# Patient Record
Sex: MALE | Race: WHITE | NOT HISPANIC OR LATINO | Employment: FULL TIME | URBAN - METROPOLITAN AREA
[De-identification: names, ages, dates, MRNs, and addresses within clinical notes are randomized per-mention and may not be internally consistent; named-entity substitution may affect disease eponyms.]

---

## 2017-03-14 ENCOUNTER — APPOINTMENT (OUTPATIENT)
Dept: CT IMAGING | Facility: HOSPITAL | Age: 42
End: 2017-03-14

## 2017-03-14 LAB
ALBUMIN SERPL-MCNC: 4.6 G/DL (ref 3.5–5.2)
ALBUMIN/GLOB SERPL: 1.8 G/DL
ALP SERPL-CCNC: 63 U/L (ref 39–117)
ALT SERPL W P-5'-P-CCNC: 37 U/L (ref 1–41)
ANION GAP SERPL CALCULATED.3IONS-SCNC: 13.6 MMOL/L
AST SERPL-CCNC: 22 U/L (ref 1–40)
BACTERIA UR QL AUTO: ABNORMAL /HPF
BASOPHILS # BLD AUTO: 0.04 10*3/MM3 (ref 0–0.2)
BASOPHILS NFR BLD AUTO: 0.3 % (ref 0–1.5)
BILIRUB SERPL-MCNC: 1.2 MG/DL (ref 0.1–1.2)
BILIRUB UR QL STRIP: NEGATIVE
BUN BLD-MCNC: 20 MG/DL (ref 6–20)
BUN/CREAT SERPL: 14.5 (ref 7–25)
CALCIUM SPEC-SCNC: 10.1 MG/DL (ref 8.6–10.5)
CHLORIDE SERPL-SCNC: 103 MMOL/L (ref 98–107)
CLARITY UR: ABNORMAL
CO2 SERPL-SCNC: 25.4 MMOL/L (ref 22–29)
COLOR UR: YELLOW
CREAT BLD-MCNC: 1.38 MG/DL (ref 0.76–1.27)
DEPRECATED RDW RBC AUTO: 40.4 FL (ref 37–54)
EOSINOPHIL # BLD AUTO: 0.06 10*3/MM3 (ref 0–0.7)
EOSINOPHIL NFR BLD AUTO: 0.4 % (ref 0.3–6.2)
ERYTHROCYTE [DISTWIDTH] IN BLOOD BY AUTOMATED COUNT: 12.9 % (ref 11.5–14.5)
GFR SERPL CREATININE-BSD FRML MDRD: 57 ML/MIN/1.73
GLOBULIN UR ELPH-MCNC: 2.5 GM/DL
GLUCOSE BLD-MCNC: 134 MG/DL (ref 65–99)
GLUCOSE UR STRIP-MCNC: NEGATIVE MG/DL
HCT VFR BLD AUTO: 47.8 % (ref 40.4–52.2)
HGB BLD-MCNC: 16.4 G/DL (ref 13.7–17.6)
HGB UR QL STRIP.AUTO: ABNORMAL
HYALINE CASTS UR QL AUTO: ABNORMAL /LPF
IMM GRANULOCYTES # BLD: 0.03 10*3/MM3 (ref 0–0.03)
IMM GRANULOCYTES NFR BLD: 0.2 % (ref 0–0.5)
KETONES UR QL STRIP: NEGATIVE
LEUKOCYTE ESTERASE UR QL STRIP.AUTO: NEGATIVE
LIPASE SERPL-CCNC: 82 U/L (ref 13–60)
LYMPHOCYTES # BLD AUTO: 1.23 10*3/MM3 (ref 0.9–4.8)
LYMPHOCYTES NFR BLD AUTO: 8.3 % (ref 19.6–45.3)
MCH RBC QN AUTO: 29.3 PG (ref 27–32.7)
MCHC RBC AUTO-ENTMCNC: 34.3 G/DL (ref 32.6–36.4)
MCV RBC AUTO: 85.5 FL (ref 79.8–96.2)
MONOCYTES # BLD AUTO: 0.45 10*3/MM3 (ref 0.2–1.2)
MONOCYTES NFR BLD AUTO: 3 % (ref 5–12)
NEUTROPHILS # BLD AUTO: 13.03 10*3/MM3 (ref 1.9–8.1)
NEUTROPHILS NFR BLD AUTO: 87.8 % (ref 42.7–76)
NITRITE UR QL STRIP: NEGATIVE
PH UR STRIP.AUTO: 5.5 [PH] (ref 5–8)
PLATELET # BLD AUTO: 217 10*3/MM3 (ref 140–500)
PMV BLD AUTO: 9.9 FL (ref 6–12)
POTASSIUM BLD-SCNC: 4.8 MMOL/L (ref 3.5–5.2)
PROT SERPL-MCNC: 7.1 G/DL (ref 6–8.5)
PROT UR QL STRIP: NEGATIVE
RBC # BLD AUTO: 5.59 10*6/MM3 (ref 4.6–6)
RBC # UR: ABNORMAL /HPF
REF LAB TEST METHOD: ABNORMAL
SODIUM BLD-SCNC: 142 MMOL/L (ref 136–145)
SP GR UR STRIP: 1.02 (ref 1–1.03)
SQUAMOUS #/AREA URNS HPF: ABNORMAL /HPF
UROBILINOGEN UR QL STRIP: ABNORMAL
WBC NRBC COR # BLD: 14.84 10*3/MM3 (ref 4.5–10.7)
WBC UR QL AUTO: ABNORMAL /HPF

## 2017-03-14 PROCEDURE — 80053 COMPREHEN METABOLIC PANEL: CPT | Performed by: EMERGENCY MEDICINE

## 2017-03-14 PROCEDURE — 36415 COLL VENOUS BLD VENIPUNCTURE: CPT | Performed by: EMERGENCY MEDICINE

## 2017-03-14 PROCEDURE — 85025 COMPLETE CBC W/AUTO DIFF WBC: CPT | Performed by: EMERGENCY MEDICINE

## 2017-03-14 PROCEDURE — 74176 CT ABD & PELVIS W/O CONTRAST: CPT

## 2017-03-14 PROCEDURE — 99283 EMERGENCY DEPT VISIT LOW MDM: CPT

## 2017-03-14 PROCEDURE — 81001 URINALYSIS AUTO W/SCOPE: CPT | Performed by: EMERGENCY MEDICINE

## 2017-03-14 PROCEDURE — 83690 ASSAY OF LIPASE: CPT | Performed by: EMERGENCY MEDICINE

## 2017-03-14 RX ORDER — SODIUM CHLORIDE 0.9 % (FLUSH) 0.9 %
10 SYRINGE (ML) INJECTION AS NEEDED
Status: DISCONTINUED | OUTPATIENT
Start: 2017-03-14 | End: 2017-03-15 | Stop reason: HOSPADM

## 2017-03-15 ENCOUNTER — HOSPITAL ENCOUNTER (EMERGENCY)
Facility: HOSPITAL | Age: 42
Discharge: HOME | End: 2017-03-15
Attending: EMERGENCY MEDICINE | Admitting: EMERGENCY MEDICINE

## 2017-03-15 VITALS
RESPIRATION RATE: 16 BRPM | DIASTOLIC BLOOD PRESSURE: 86 MMHG | HEART RATE: 79 BPM | TEMPERATURE: 96.9 F | HEIGHT: 73 IN | WEIGHT: 205 LBS | BODY MASS INDEX: 27.17 KG/M2 | OXYGEN SATURATION: 98 % | SYSTOLIC BLOOD PRESSURE: 128 MMHG

## 2017-03-15 DIAGNOSIS — N23 RENAL COLIC ON RIGHT SIDE: Primary | ICD-10-CM

## 2017-03-15 LAB
HOLD SPECIMEN: NORMAL
WHOLE BLOOD HOLD SPECIMEN: NORMAL

## 2017-03-15 RX ORDER — ONDANSETRON 8 MG/1
8 TABLET, ORALLY DISINTEGRATING ORAL EVERY 8 HOURS PRN
Qty: 12 TABLET | Refills: 0 | Status: SHIPPED | OUTPATIENT
Start: 2017-03-15 | End: 2017-06-16

## 2017-03-15 RX ORDER — HYDROCODONE BITARTRATE AND ACETAMINOPHEN 5; 325 MG/1; MG/1
1 TABLET ORAL EVERY 6 HOURS PRN
Qty: 15 TABLET | Refills: 0 | Status: SHIPPED | OUTPATIENT
Start: 2017-03-15 | End: 2017-06-16

## 2017-03-15 NOTE — ED NOTES
C/o right flank pain, hx of kidney stones, states pain since around 1700.      Daniela Chua, KENDAL  03/14/17 2053

## 2017-03-15 NOTE — ED PROVIDER NOTES
EMERGENCY DEPARTMENT ENCOUNTER    CHIEF COMPLAINT  Chief Complaint: Flank Pain  History given by: Patient  History limited by:   Room Number: 13/13  PMD: Jesse Witt MD  Urologist: Dr. Becerra    HPI:  Pt is a 41 y.o. male who presents complaining of R flank pain that onset yesterday at 1700. Pt also reports some nausea with the pain. He describes the pain as sharp and stabbing. He has a hx of nephrolithiasis and states this pain was simlar to previous. While giving urine sample in the ED, pt reports feeling a sharp pain during urination and noted some hematuria and believes that he may have passed stone. He states that pain has improved since.     Duration:  9 hours  Onset: sudden  Timing: constant  Location: R flank  Radiation: none  Quality: sharp/stabbing  Intensity/Severity: severe  Progression: improved  Associated Symptoms: nausea, hematuria  Aggravating Factors: none  Alleviating Factors: urination  Previous Episodes: hx of nephrolithiasis  Treatment before arrival: none    PAST MEDICAL HISTORY  Active Ambulatory Problems     Diagnosis Date Noted   • Gastroesophageal reflux disease 09/29/2016   • Cough variant asthma 09/29/2016   • Hyperlipidemia 09/29/2016   • Gilbert's syndrome 09/29/2016   • Low back pain 09/29/2016   • Rosacea 09/29/2016   • Vitamin D deficiency 09/29/2016   • Healthcare maintenance 10/07/2016   • Environmental allergies 10/07/2016   • Snoring 10/07/2016     Resolved Ambulatory Problems     Diagnosis Date Noted   • No Resolved Ambulatory Problems     Past Medical History   Diagnosis Date   • DDD (degenerative disc disease), lumbar    • GERD (gastroesophageal reflux disease)    • Lumbar radiculopathy    • Nephrolithiasis        PAST SURGICAL HISTORY  Past Surgical History   Procedure Laterality Date   • Eye surgery       lasik bilateral 2013   • Cystoscopy       with insertion of ureteral stent 2013   • Septoplasty       2008 with T&A   • Vasectomy       2010   • Tonsillectomy and  "adenoidectomy       2008       FAMILY HISTORY  Family History   Problem Relation Age of Onset   • Hyperlipidemia Mother    • Rosacea Mother    • Heart disease Father    • Prostate cancer Father    • Hyperlipidemia Father    • Nephrolithiasis Father    • Hyperlipidemia Brother    • Diabetes Brother        SOCIAL HISTORY  Social History     Social History   • Marital status:      Spouse name: N/A   • Number of children: 2   • Years of education: N/A     Occupational History   • [employer] Director of Financial Aide      Social History Main Topics   • Smoking status: Former Smoker   • Smokeless tobacco: Not on file      Comment: 1 pk/yr in past quit   • Alcohol use Yes      Comment: 1-2 drinks/week   • Drug use: No   • Sexual activity: Yes     Partners: Female      Comment: wife only; no hx STD's     Other Topics Concern   • Not on file     Social History Narrative    Diet - \"normal\"; eats fruits and veggies    Exercise - None       ALLERGIES  Naproxen    REVIEW OF SYSTEMS  Review of Systems   Constitutional: Negative for activity change, appetite change and fever.   HENT: Negative for congestion and sore throat.    Eyes: Negative.    Respiratory: Negative for cough and shortness of breath.    Cardiovascular: Negative for chest pain and leg swelling.   Gastrointestinal: Positive for nausea. Negative for abdominal pain, diarrhea and vomiting.   Endocrine: Negative.    Genitourinary: Positive for flank pain and hematuria. Negative for decreased urine volume and dysuria.   Musculoskeletal: Negative for neck pain.   Skin: Negative for rash and wound.   Allergic/Immunologic: Negative.    Neurological: Negative for weakness, numbness and headaches.   Hematological: Negative.    Psychiatric/Behavioral: Negative.    All other systems reviewed and are negative.      PHYSICAL EXAM  ED Triage Vitals   Temp Heart Rate Resp BP SpO2   03/14/17 2053 03/14/17 2053 03/14/17 2053 03/14/17 2101 03/14/17 2053   96.9 °F (36.1 °C) 106 18 " 117/82 100 %      Temp src Heart Rate Source Patient Position BP Location FiO2 (%)   03/14/17 2053 03/14/17 2053 03/14/17 2101 03/14/17 2101 --   Tympanic Monitor Sitting Left arm        Physical Exam   Constitutional: He is oriented to person, place, and time and well-developed, well-nourished, and in no distress.   HENT:   Head: Normocephalic and atraumatic.   Eyes: EOM are normal. Pupils are equal, round, and reactive to light.   Neck: Normal range of motion. Neck supple.   Cardiovascular: Normal rate, regular rhythm and normal heart sounds.    Pulmonary/Chest: Effort normal and breath sounds normal. No respiratory distress.   Abdominal: Soft. There is tenderness in the right upper quadrant and right lower quadrant. There is CVA tenderness ( R). There is no rebound and no guarding.   Musculoskeletal: Normal range of motion. He exhibits no edema.   Neurological: He is alert and oriented to person, place, and time. He has normal sensation and normal strength.   Skin: Skin is warm and dry.   Psychiatric: Mood and affect normal.   Nursing note and vitals reviewed.      LAB RESULTS  Lab Results (last 24 hours)     Procedure Component Value Units Date/Time    CBC & Differential [[id number]] Collected:  03/14/17 2150    Specimen:  Blood Updated:  03/14/17 2212    Narrative:       The following orders were created for panel order CBC & Differential.  Procedure                               Abnormality         Status                     ---------                               -----------         ------                     CBC Auto Differential[[id number]]         Abnormal            Final result                 Please view results for these tests on the individual orders.    Comprehensive Metabolic Panel [[id number]]  (Abnormal) Collected:  03/14/17 2150    Specimen:  Blood Updated:  03/14/17 2231     Glucose 134 (H) mg/dL      BUN 20 mg/dL      Creatinine 1.38 (H) mg/dL      Sodium 142 mmol/L      Potassium 4.8 mmol/L       Chloride 103 mmol/L      CO2 25.4 mmol/L      Calcium 10.1 mg/dL      Total Protein 7.1 g/dL      Albumin 4.60 g/dL      ALT (SGPT) 37 U/L      AST (SGOT) 22 U/L      Alkaline Phosphatase 63 U/L      Total Bilirubin 1.2 mg/dL      eGFR Non African Amer 57 (L) mL/min/1.73      Globulin 2.5 gm/dL      A/G Ratio 1.8 g/dL      BUN/Creatinine Ratio 14.5      Anion Gap 13.6 mmol/L     Lipase [[id number]]  (Abnormal) Collected:  03/14/17 2150    Specimen:  Blood Updated:  03/14/17 2231     Lipase 82 (H) U/L     CBC Auto Differential [[id number]]  (Abnormal) Collected:  03/14/17 2150    Specimen:  Blood Updated:  03/14/17 2212     WBC 14.84 (H) 10*3/mm3      RBC 5.59 10*6/mm3      Hemoglobin 16.4 g/dL      Hematocrit 47.8 %      MCV 85.5 fL      MCH 29.3 pg      MCHC 34.3 g/dL      RDW 12.9 %      RDW-SD 40.4 fl      MPV 9.9 fL      Platelets 217 10*3/mm3      Neutrophil % 87.8 (H) %      Lymphocyte % 8.3 (L) %      Monocyte % 3.0 (L) %      Eosinophil % 0.4 %      Basophil % 0.3 %      Immature Grans % 0.2 %      Neutrophils, Absolute 13.03 (H) 10*3/mm3      Lymphocytes, Absolute 1.23 10*3/mm3      Monocytes, Absolute 0.45 10*3/mm3      Eosinophils, Absolute 0.06 10*3/mm3      Basophils, Absolute 0.04 10*3/mm3      Immature Grans, Absolute 0.03 10*3/mm3     Urinalysis With / Culture If Indicated [[id number]]  (Abnormal) Collected:  03/14/17 2154    Specimen:  Urine from Urine, Clean Catch Updated:  03/14/17 2209     Color, UA Yellow      Appearance, UA Cloudy (A)      pH, UA 5.5      Specific Gravity, UA 1.025      Glucose, UA Negative      Ketones, UA Negative      Bilirubin, UA Negative      Blood, UA Large (3+) (A)      Protein, UA Negative      Leuk Esterase, UA Negative      Nitrite, UA Negative      Urobilinogen, UA 0.2 E.U./dL     Urinalysis, Microscopic Only [[id number]]  (Abnormal) Collected:  03/14/17 2154    Specimen:  Urine from Urine, Clean Catch Updated:  03/14/17 2209     RBC, UA 21-30 (A) /HPF      WBC, UA 0-2 /HPF       Bacteria, UA None Seen /HPF      Squamous Epithelial Cells, UA 0-2 /HPF      Hyaline Casts, UA 3-6 /LPF      Methodology Automated Microscopy           I ordered the above labs and reviewed the results    RADIOLOGY  CT Abdomen Pelvis Without Contrast   Final Result   Final result by Luis Diaz MD (03/15/17 00:27:24)     Narrative:     CT SCAN OF THE ABDOMEN AND PELVIS WITHOUT CONTRAST     HISTORY: Previous kidney stones. Right flank pain.     The CT scan was performed as an emergency procedure through the abdomen  and pelvis without contrast and demonstrates the followin. The kidneys are normal in size and there is a very tiny  nonobstructing stone in the left kidney. There is no evidence of  ureteral stone or obstruction. The urinary bladder has a smooth contour.     2. The lung bases are clear. The liver, spleen, pancreas, and both  adrenal glands are unremarkable without intravenous contrast. The  gallbladder shows no gallstones or wall thickening.     3.  There is no aortic aneurysm or retroperitoneal lymphadenopathy. The  large and small bowel loops are normal in caliber and show no  inflammatory change. The appendix appears normal. The remainder of the  pelvis is also unremarkable.     This report was finalized on 3/15/2017 12:27 AM by Dr. Yoni Diaz MD.                I ordered the above noted radiological studies. Interpreted by radiologist. Reviewed by me in PACS.       PROCEDURES  Procedures      PROGRESS AND CONSULTS  ED Course   2:09 AM  Discussed with pt about his CT result showing no kidney. Informed pt of plan to discharge with analgesics and to follow up with his urologist. Pt understands and agrees with plan. All concerns addressed.         MEDICAL DECISION MAKING       MDM  Number of Diagnoses or Management Options  Renal colic on right side:      Amount and/or Complexity of Data Reviewed  Clinical lab tests: ordered and reviewed  Tests in the radiology section of CPT®:  ordered and reviewed           DIAGNOSIS  Final diagnoses:   Renal colic on right side       DISPOSITION  DISCHARGE    Patient discharged in stable condition.    Reviewed implications of results, diagnosis, meds, responsibility to follow up, warning signs and symptoms of possible worsening, potential complications and reasons to return to ER.    Patient/Family voiced understanding of above instructions.    Discussed plan for discharge, as there is no emergent indication for admission.  Pt/family is agreeable and understands need for follow up and repeat testing.  Pt is aware that discharge does not mean that nothing is wrong but it indicates no emergency is present that requires admission and they must continue care with follow-up as given below or physician of their choice.     FOLLOW-UP  Cresencio Becerra MD  [address] [address]  [phone]    Call today  For urologist follow-up         Medication List      New Prescriptions          HYDROcodone-acetaminophen 5-325 MG per tablet   Commonly known as:  NORCO   Take 1 tablet by mouth Every 6 (Six) Hours As Needed for Moderate Pain   (4-6).       ondansetron ODT 8 MG disintegrating tablet   Commonly known as:  ZOFRAN ODT   Take 1 tablet by mouth Every 8 (Eight) Hours As Needed for Nausea or   Vomiting.         Stop          fish oil 1000 MG capsule capsule               Latest Documented Vital Signs:  As of 3:23 AM  BP- 128/86 HR- 79 Temp- 96.9 °F (36.1 °C) (Tympanic) O2 sat- 98%    --  Documentation assistance provided by darnell Herrera for Dr. Mcintyre.  Information recorded by the scribe was done at my direction and has been verified and validated by me.       Felipe Herrera  03/15/17 0234       Armando Mcintyre MD  03/15/17 [id number]

## 2017-03-15 NOTE — ED NOTES
"Pt states he had sudden onset of severe pain in right flank area.  States it \"comes in waves every 10-15 minutes\".  He did have an episode of N/V, and vomited moderate amount of emesis.  Has hx of kidney stones.  Last episode was in October of 2016 and pt believes had passed the stone on his own.  Pt sees Dr. Becerra with urology     Mindy Byrne, KENDAL  03/14/17 2026    "

## 2017-03-17 ENCOUNTER — TELEPHONE (OUTPATIENT)
Dept: SOCIAL WORK | Facility: HOSPITAL | Age: 42
End: 2017-03-17

## 2017-05-25 RX ORDER — BUDESONIDE AND FORMOTEROL FUMARATE DIHYDRATE 160; 4.5 UG/1; UG/1
2 AEROSOL RESPIRATORY (INHALATION)
Qty: 10.2 G | Refills: 12 | Status: SHIPPED | OUTPATIENT
Start: 2017-05-25 | End: 2019-11-08

## 2017-06-12 RX ORDER — ATORVASTATIN CALCIUM 40 MG/1
TABLET, FILM COATED ORAL
Qty: 90 TABLET | Refills: 1 | Status: SHIPPED | OUTPATIENT
Start: 2017-06-12 | End: 2017-08-01

## 2017-06-16 ENCOUNTER — OFFICE VISIT (OUTPATIENT)
Dept: INTERNAL MEDICINE | Facility: CLINIC | Age: 42
End: 2017-06-16

## 2017-06-16 VITALS
SYSTOLIC BLOOD PRESSURE: 115 MMHG | HEART RATE: 81 BPM | TEMPERATURE: 98 F | BODY MASS INDEX: 27.7 KG/M2 | HEIGHT: 73 IN | DIASTOLIC BLOOD PRESSURE: 80 MMHG | WEIGHT: 209 LBS | OXYGEN SATURATION: 97 %

## 2017-06-16 DIAGNOSIS — G57.02 PIRIFORMIS SYNDROME OF LEFT SIDE: Primary | ICD-10-CM

## 2017-06-16 PROCEDURE — 99214 OFFICE O/P EST MOD 30 MIN: CPT | Performed by: INTERNAL MEDICINE

## 2017-06-16 RX ORDER — METHYLPREDNISOLONE 4 MG/1
TABLET ORAL
Qty: 21 TABLET | Refills: 0 | Status: SHIPPED | OUTPATIENT
Start: 2017-06-16 | End: 2017-07-17

## 2017-06-16 NOTE — PROGRESS NOTES
"Hip Pain (left )      HPI  Enzo Sewell is a 41 y.o. male RTC In acute care:  Notes for \"about 4 weeks has been having some pain in L hip and lower leg\".  Notes pain occurs around 2 AM and then prevent from getting back to sleep.  Thought maybe tweaked back but does not really feel like it is back as has not back pain.  No pain when up and walking during day. Can have some pain when sits to long but can get up and walk and pain resolves.    At night, first pain starts around 10 PM when laying on couch. Resolves when stands, then recurs around 2AM when in bed. Pain is \"tingling\" and feels like \"it locks up\".  No weakness in leg.  No associated back pain. No specific event brought this on.  Pain is in L buttocks, L hamstring area to knee, and in L groin.   This pain is somewhat similar to low back pain, but in totally different location.    Is still active in yard. Still doing PT exercises that done in past for low back. Does stretches each night.   Has not used wallet in back pocket in years. Sits on pillow/ cushion at work.     Review of Systems   Musculoskeletal: Positive for joint pain (L buttocks and posterior hip). Negative for back pain, falls, joint swelling and muscle weakness.   Neurological: Negative for focal weakness.       The following portions of the patient's history were reviewed and updated as appropriate: allergies, current medications, past medical history and problem list.      Current Outpatient Prescriptions:   •  albuterol (PROAIR HFA) 108 (90 BASE) MCG/ACT inhaler, ProAir  (90 Base) MCG/ACT Inhalation Aerosol Solution; Patient Sig: ProAir  (90 Base) MCG/ACT Inhalation Aerosol Solution USE 2 PUFFS BY MOUTH EVERY 6 HOURS ATC for 3 days, then q 6 hours PRN; 8.5; 1; 01-Oct-2013; Active; Disregard prior PRN only Rx.  These are updated directions for pt for med., Disp: , Rfl:   •  atorvastatin (LIPITOR) 40 MG tablet, TAKE 1 TABLET DAILY, Disp: 90 tablet, Rfl: 1  •  " "budesonide-formoterol (SYMBICORT) 160-4.5 MCG/ACT inhaler, Inhale 2 puffs 2 (Two) Times a Day., Disp: 10.2 g, Rfl: 12  •  Cholecalciferol (VITAMIN D-3) 1000 UNITS capsule, Take  by mouth Daily., Disp: , Rfl:   •  Omega-3 Fatty Acids (FISH OIL) 1000 MG capsule capsule, Take  by mouth., Disp: , Rfl:   •  ranitidine (ZANTAC) 75 MG tablet, Take 1 tablet by mouth Daily As Needed., Disp: , Rfl:   •  MethylPREDNISolone (MEDROL, ROBERTO,) 4 MG tablet, Take as directed on package instructions., Disp: 21 tablet, Rfl: 0  •  ORACEA 40 MG capsule, , Disp: , Rfl:     Vitals:    06/16/17 0855   BP: 115/80   Pulse: 81   Temp: 98 °F (36.7 °C)   SpO2: 97%   Weight: 209 lb (94.8 kg)   Height: 73\" (185.4 cm)         Physical Exam   Constitutional: He is oriented to person, place, and time. He appears well-developed and well-nourished. No distress.   Pulmonary/Chest: Effort normal. No respiratory distress.   Musculoskeletal: He exhibits no edema.        Left hip: He exhibits normal range of motion, normal strength, no tenderness (no TTP over trochanteric bursa; no TTP over groin), no bony tenderness, no swelling and no crepitus.        Left knee: He exhibits normal range of motion and no swelling.        Lumbar back: He exhibits normal range of motion, no tenderness, no bony tenderness, no swelling and no deformity.   (-) straight leg raise test B  (-) pain with resisted external and internal rotation of L leg   Neurological: He is alert and oriented to person, place, and time. He has normal strength. He exhibits normal muscle tone. Gait normal.   Reflex Scores:       Patellar reflexes are 2+ on the right side and 2+ on the left side.       Achilles reflexes are 2+ on the right side and 2+ on the left side.  5/5 BLE strength proximal and distal     Vitals reviewed.      Assessment/ Plan  Diagnoses and all orders for this visit:    Piriformis syndrome of left side  -     MethylPREDNISolone (MEDROL, ROBERTO,) 4 MG tablet; Take as directed on " package instructions.    Other orders  -     ORACEA 40 MG capsule;         Return for Next scheduled follow up.      Discussion:  Enzo Sewell is a 41 y.o. male  With long hx of chronic low back pain RTC in acute care (new issue to examiner) with 4 weeks of L buttocks and hamstring region pain brought on by sitting and lying down, improved with ambulation. Exam is benign today and cannot reproduce pain.  Reviewed MRI (scanned to chart) that revealed L4-S1 DDD and DJD though was of mild degree and showed had resorbed L5-S1 disc herniation to L.  I am more suspicious today that pain is more related to piriformis syndrome then from low back based on exam and hx.  Will have pt complete Medrol dose pack over weekend and c/w PT exercises.  If pt is not able to get any relief, will have him reengage in PT.  Repeat MRI is option, but we both agree to hold on imaging at this time given these sx are not typical for his past low back issues.  Pt to call next week if not better or progression.

## 2017-06-19 ENCOUNTER — TELEPHONE (OUTPATIENT)
Dept: INTERNAL MEDICINE | Facility: CLINIC | Age: 42
End: 2017-06-19

## 2017-06-19 DIAGNOSIS — G57.02 PIRIFORMIS SYNDROME OF LEFT SIDE: Primary | ICD-10-CM

## 2017-06-19 DIAGNOSIS — M51.36 DDD (DEGENERATIVE DISC DISEASE), LUMBAR: ICD-10-CM

## 2017-06-19 NOTE — TELEPHONE ENCOUNTER
Would like pt to restart PT, but if this level of pain persists, will need to reimage low back.   PT order placed.

## 2017-06-19 NOTE — TELEPHONE ENCOUNTER
Pt called and stated that he was in a lot of pain last night with his hip. He was in thought of going to the hospital because he was in so much pain. He is wanting to know what he needs to do.

## 2017-06-20 ENCOUNTER — OFFICE VISIT (OUTPATIENT)
Dept: INTERNAL MEDICINE | Facility: CLINIC | Age: 42
End: 2017-06-20

## 2017-06-20 VITALS
SYSTOLIC BLOOD PRESSURE: 120 MMHG | HEIGHT: 73 IN | TEMPERATURE: 97.7 F | WEIGHT: 210 LBS | OXYGEN SATURATION: 99 % | BODY MASS INDEX: 27.83 KG/M2 | HEART RATE: 83 BPM | DIASTOLIC BLOOD PRESSURE: 90 MMHG

## 2017-06-20 DIAGNOSIS — M54.50 CHRONIC LOW BACK PAIN WITHOUT SCIATICA, UNSPECIFIED BACK PAIN LATERALITY: ICD-10-CM

## 2017-06-20 DIAGNOSIS — M51.36 DDD (DEGENERATIVE DISC DISEASE), LUMBAR: Primary | ICD-10-CM

## 2017-06-20 DIAGNOSIS — G57.02 PIRIFORMIS SYNDROME OF LEFT SIDE: ICD-10-CM

## 2017-06-20 DIAGNOSIS — G89.29 CHRONIC LOW BACK PAIN WITHOUT SCIATICA, UNSPECIFIED BACK PAIN LATERALITY: ICD-10-CM

## 2017-06-20 PROCEDURE — 72110 X-RAY EXAM L-2 SPINE 4/>VWS: CPT | Performed by: INTERNAL MEDICINE

## 2017-06-20 PROCEDURE — 99214 OFFICE O/P EST MOD 30 MIN: CPT | Performed by: INTERNAL MEDICINE

## 2017-06-20 RX ORDER — MELOXICAM 15 MG/1
15 TABLET ORAL DAILY
Qty: 10 TABLET | Refills: 0 | Status: SHIPPED | OUTPATIENT
Start: 2017-06-20 | End: 2017-07-17

## 2017-06-20 RX ORDER — CYCLOBENZAPRINE HCL 5 MG
5 TABLET ORAL NIGHTLY PRN
Qty: 30 TABLET | Refills: 0 | Status: SHIPPED | OUTPATIENT
Start: 2017-06-20 | End: 2017-07-17 | Stop reason: HOSPADM

## 2017-06-20 RX ORDER — TRAMADOL HYDROCHLORIDE 50 MG/1
50 TABLET ORAL EVERY 8 HOURS PRN
Qty: 30 TABLET | Refills: 1 | Status: SHIPPED | OUTPATIENT
Start: 2017-06-20 | End: 2017-08-07 | Stop reason: HOSPADM

## 2017-06-20 NOTE — PROGRESS NOTES
"Hip Pain (left )      HPI  Enzo Sewell is a 41 y.o. male RTC In short interval f/u:   Notes pain has progressed since seen last week.  Notes over weekend pain was so bad that almost went to ED.  Took 30 minutes to get out of bed due to severe pain.  Has to walk around a long time to \"stretch it out\".  \"I am not able to function\".  Pain was in groin and back of buttocks when woke in night.  Pain running down back of leg is less severe, but pt feels like entire area from buttocks to groin is most severe and has more consistent pain.   Has not started PT yet.    \"I have cleared my schedule due to work\" due to limited pain.  \"My wife is cancelling vacations\".     At this point is about 3 weeks into pain, but is now worse in last 7 days.    Steroids did not really help. Only notes that 10AM to 10PM does 'alright', but at night with lack of movement and laying down really causes severe pain.    Meds: Ibuprofen 4-6 OTC pills/ daily      Review of Systems   Constitution: Negative for chills and fever.   Skin: Negative for rash.   Musculoskeletal: Positive for back pain. Negative for muscle weakness.   Neurological: Positive for numbness (\"tingling\"). Negative for focal weakness and sensory change.       The following portions of the patient's history were reviewed and updated as appropriate: allergies, current medications, past medical history and problem list.      Current Outpatient Prescriptions:   •  albuterol (PROAIR HFA) 108 (90 BASE) MCG/ACT inhaler, ProAir  (90 Base) MCG/ACT Inhalation Aerosol Solution; Patient Sig: ProAir  (90 Base) MCG/ACT Inhalation Aerosol Solution USE 2 PUFFS BY MOUTH EVERY 6 HOURS ATC for 3 days, then q 6 hours PRN; 8.5; 1; 01-Oct-2013; Active; Disregard prior PRN only Rx.  These are updated directions for pt for med., Disp: , Rfl:   •  atorvastatin (LIPITOR) 40 MG tablet, TAKE 1 TABLET DAILY, Disp: 90 tablet, Rfl: 1  •  budesonide-formoterol (SYMBICORT) 160-4.5 MCG/ACT " "inhaler, Inhale 2 puffs 2 (Two) Times a Day., Disp: 10.2 g, Rfl: 12  •  Cholecalciferol (VITAMIN D-3) 1000 UNITS capsule, Take  by mouth Daily., Disp: , Rfl:   •  MethylPREDNISolone (MEDROL, ROBERTO,) 4 MG tablet, Take as directed on package instructions., Disp: 21 tablet, Rfl: 0  •  Omega-3 Fatty Acids (FISH OIL) 1000 MG capsule capsule, Take  by mouth., Disp: , Rfl:   •  ORACEA 40 MG capsule, , Disp: , Rfl:   •  ranitidine (ZANTAC) 75 MG tablet, Take 1 tablet by mouth Daily As Needed., Disp: , Rfl:   •  cyclobenzaprine (FLEXERIL) 5 MG tablet, Take 1 tablet by mouth At Night As Needed for Muscle Spasms., Disp: 30 tablet, Rfl: 0  •  meloxicam (MOBIC) 15 MG tablet, Take 1 tablet by mouth Daily., Disp: 10 tablet, Rfl: 0  •  traMADol (ULTRAM) 50 MG tablet, Take 1 tablet by mouth Every 8 (Eight) Hours As Needed for Moderate Pain (4-6)., Disp: 30 tablet, Rfl: 1    Vitals:    06/20/17 1102   BP: 120/90   Pulse: 83   Temp: 97.7 °F (36.5 °C)   SpO2: 99%   Weight: 210 lb (95.3 kg)   Height: 73\" (185.4 cm)         Physical Exam   Constitutional: He is oriented to person, place, and time. He appears well-developed and well-nourished.   HENT:   Head: Normocephalic and atraumatic.   Pulmonary/Chest: Effort normal. No respiratory distress.   Musculoskeletal: He exhibits no edema.        Lumbar back: He exhibits normal range of motion, no tenderness and no bony tenderness.   (-) straight leg raise B  No pain when laying back onto table or sitting up from lying position     Neurological: He is alert and oriented to person, place, and time. He has normal strength. No cranial nerve deficit. He exhibits normal muscle tone. Gait normal.   Reflex Scores:       Patellar reflexes are 2+ on the right side and 2+ on the left side.       Achilles reflexes are 2+ on the right side and 2+ on the left side.  5/5 BLE strength proximal and distal     Skin: No rash (on low back) noted.   Vitals reviewed.    Lumbar X-ray: Low back and L buttock/ hip " pain: No prior for comparison  DDD noted at L5-S1 with moderate joint space narrowing  No fracture  Normal alignment    L hip X-ray: Low back and L buttock/ hip pain: No prior for comparison  Normal hip joint  No joint space narrowing  No fracture    Assessment/ Plan  Diagnoses and all orders for this visit:    DDD (degenerative disc disease), lumbar  -     traMADol (ULTRAM) 50 MG tablet; Take 1 tablet by mouth Every 8 (Eight) Hours As Needed for Moderate Pain (4-6).  -     meloxicam (MOBIC) 15 MG tablet; Take 1 tablet by mouth Daily.  -     cyclobenzaprine (FLEXERIL) 5 MG tablet; Take 1 tablet by mouth At Night As Needed for Muscle Spasms.  -     MRI Lumbar Spine Without Contrast; Future  -     XR Spine Lumbar 4+ View (In Office)    Piriformis syndrome of left side  -     traMADol (ULTRAM) 50 MG tablet; Take 1 tablet by mouth Every 8 (Eight) Hours As Needed for Moderate Pain (4-6).  -     meloxicam (MOBIC) 15 MG tablet; Take 1 tablet by mouth Daily.  -     cyclobenzaprine (FLEXERIL) 5 MG tablet; Take 1 tablet by mouth At Night As Needed for Muscle Spasms.  -     MRI Lumbar Spine Without Contrast; Future    Chronic low back pain without sciatica, unspecified back pain laterality  -     traMADol (ULTRAM) 50 MG tablet; Take 1 tablet by mouth Every 8 (Eight) Hours As Needed for Moderate Pain (4-6).  -     meloxicam (MOBIC) 15 MG tablet; Take 1 tablet by mouth Daily.  -     cyclobenzaprine (FLEXERIL) 5 MG tablet; Take 1 tablet by mouth At Night As Needed for Muscle Spasms.  -     MRI Lumbar Spine Without Contrast; Future  -     XR Spine Lumbar 4+ View (In Office)        Return for Next scheduled follow up.      Discussion:  Enzo Sewell is a 41 y.o. male RTC in short interval f/u after recent visit for low back and L hip pain.  Pain has accelerated since visit on 6/16/17 and is now interfering with ability to work and sleep. Pt is s/p steroid dose pack with, again, acceleration in pain.  I reviewed 2011 MRI with  noted DDD at L4-L5 and hx of disc herniation that was largely resorbed on 2011 MRI. Given pt acceleration in pain and non-response to steroids, I do think we need to move forward with additional imaging. Pt is neuromuscularly intact on exam and does not require urgent MRI.  We discussed pain control today and will start tramadol for nighttime use with muslce relaxer (caution sedation side effects).  Pt will use Mobic in AM for daytime control when upright and moving. Tree checked today and Consent completed.  Will f/u with pt after MRI completed. I have asked pt to complete PT per our last visit.

## 2017-06-23 ENCOUNTER — HOSPITAL ENCOUNTER (OUTPATIENT)
Dept: MRI IMAGING | Facility: HOSPITAL | Age: 42
Discharge: HOME | End: 2017-06-23
Admitting: INTERNAL MEDICINE

## 2017-06-23 DIAGNOSIS — G89.29 CHRONIC LOW BACK PAIN WITHOUT SCIATICA, UNSPECIFIED BACK PAIN LATERALITY: ICD-10-CM

## 2017-06-23 DIAGNOSIS — M54.50 CHRONIC LOW BACK PAIN WITHOUT SCIATICA, UNSPECIFIED BACK PAIN LATERALITY: ICD-10-CM

## 2017-06-23 DIAGNOSIS — G57.02 PIRIFORMIS SYNDROME OF LEFT SIDE: ICD-10-CM

## 2017-06-23 DIAGNOSIS — M51.36 DDD (DEGENERATIVE DISC DISEASE), LUMBAR: ICD-10-CM

## 2017-06-23 PROCEDURE — 72148 MRI LUMBAR SPINE W/O DYE: CPT

## 2017-06-27 ENCOUNTER — HOSPITAL ENCOUNTER (OUTPATIENT)
Dept: PHYSICAL THERAPY | Facility: HOSPITAL | Age: 42
Setting detail: THERAPIES SERIES
Discharge: HOME | End: 2017-06-27

## 2017-06-27 DIAGNOSIS — R26.2 DIFFICULTY WALKING: ICD-10-CM

## 2017-06-27 DIAGNOSIS — M53.86 SCIATICA ASSOCIATED WITH DISORDER OF LUMBAR SPINE: ICD-10-CM

## 2017-06-27 DIAGNOSIS — Z78.9 DIFFICULTY WITH ACTIVITIES OF DAILY LIVING: Primary | ICD-10-CM

## 2017-06-27 PROCEDURE — 97162 PT EVAL MOD COMPLEX 30 MIN: CPT | Performed by: PHYSICAL THERAPIST

## 2017-06-27 PROCEDURE — 97110 THERAPEUTIC EXERCISES: CPT | Performed by: PHYSICAL THERAPIST

## 2017-06-27 NOTE — THERAPY EVALUATION
Outpatient Physical Therapy Ortho Initial Evaluation/Treatment Note  [facility]     Patient Name: Enzo Sewell  : 1975  MRN: [id number]  Today's Date: 2017      Visit Date: 2017    Patient Active Problem List   Diagnosis   • Gastroesophageal reflux disease   • Cough variant asthma   • Hyperlipidemia   • Gilbert's syndrome   • Low back pain   • Rosacea   • Vitamin D deficiency   • Healthcare maintenance   • Environmental allergies   • Snoring   • Piriformis syndrome of left side   • DDD (degenerative disc disease), lumbar        Past Medical History:   Diagnosis Date   • Cough variant asthma     normal PFT's at baseline with positive bronchoprovocation study in    • DDD (degenerative disc disease), lumbar     s/p epidural in ; more distant hx of disc herniation to L S1 nerve root in  largely resorbed on  MRI.    • GERD (gastroesophageal reflux disease)    • Gilbert's syndrome     suspected with variable indirect elevations of bilirubin on labs   • Hyperlipidemia    • Lumbar radiculopathy     s/p epidural in    • Nephrolithiasis     s/p stent and retreival 3/2013   • Rosacea    • Vitamin D deficiency         Past Surgical History:   Procedure Laterality Date   • CYSTOSCOPY      with insertion of ureteral stent    • EYE SURGERY      lasik bilateral    • SEPTOPLASTY      2008 with T&A   • TONSILLECTOMY AND ADENOIDECTOMY         • VASECTOMY      2010       Visit Dx:     ICD-10-CM ICD-9-CM   1. Difficulty with activities of daily living R53.81 799.3   2. Sciatica associated with disorder of lumbar spine M53.9 724.3   3. Difficulty walking R26.2 719.7             Patient History       17 1100          History    Chief Complaint Difficulty Walking;Difficulty with daily activities;Pain  -MP      Type of Pain Back pain;Lower Extremity / Leg  -MP      Date Current Problem(s) Began 06/15/17  -MP      Brief Description of Current Complaint Ammon reports having  lumbar spine issues for approximately 10 years being severe enough at times to receive epidural injections.  Since a few weeks ago he has experienced more discomfort sleeping and gets out of bed to stretch/move to reduce pain.  He recently began pain meds, muscle relaxers and anti-inflammatories which have helped reduce pain.somewhat but wants to reduce and control symptoms better.  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  -MP      Patient seeing anyone else for problem(s)? Yes   Dr. TAMERA Witt  -MP      How has patient tried to help current problem? Medication and exercises  -MP      What clinical tests have you had for this problem? MRI  -MP      Pain     Pain Location Back;Leg  -MP      Pain at Present 3  -MP      Pain at Best 2  -MP      Pain at Worst 9  -MP      Pain Frequency Constant/continuous  -MP      Pain Description Aching;Pins and needles;Spasm;Tingling  -MP      What Performance Factors Make the Current Problem(s) WORSE? Sleeping or supine position  -MP      What Performance Factors Make the Current Problem(s) BETTER? Repositioning, walking and stretching.  -MP      Tolerance Time- Lying 2-3 hours  -MP      Is your sleep disturbed? Yes  -MP      Is medication used to assist with sleep? Yes  -MP      Total hours of sleep per night 3-4  -MP      Fall Risk Assessment    Any falls in the past year: No  -MP      Services    Prior Rehab/Home Health Experiences No  -MP      Do you plan to receive Home Health services in the near future No  -MP      Daily Activities    Primary Language English  -MP      How does patient learn best? Listening;Pictures/Video  -MP      Pt Participated in  POC and Goals Yes  -MP      Safety    Are you being hurt, hit, or frightened by anyone at home or in your life? No  -MP      Are you being neglected by a caregiver No  -MP        User Key  (r) = Recorded By, (t) = Taken By, (c) = Cosigned By    Initials Name Provider Type    COTY Mcgraw PT Physical Therapist                PT Ortho       06/27/17 1300    Subjective Pain    Able to rate subjective pain? yes  -MP    Pre-Treatment Pain Level 3  -MP    Post-Treatment Pain Level 3  -MP    Posture/Observations    Posture/Observations Comments Posterior view of the spine in standing: R shoulder, scapula and ilium are lower than the L.  There is a slight scoliotic curve with L lumbar concavity.  Lateral view of the spine: forward head, decreased thoracic kyphosis and decreased lumbar lordosis.    -MP    Sensation    Sensation WNL? WNL  -MP    Light Touch No apparent deficits   L1-S2 dermatomes B equal/WNL  -MP    Additional Comments DTRs: LEs equal and WNL, Upper Motor Neuron Tests: Clonus and Babinski were normal  -MP    ROM (Range of Motion)    General ROM Detail L spine, standing, Flexion, minimal decrease with end range pain actively, Extenison, minimal to moderate decrease, LB L minimal decrease with end range pain, R minimal decrease with end range pain  -MP    MMT (Manual Muscle Testing)    General MMT Assessment Detail L L5 myotome, EHL, fatiguing weakness, other areas L2-S2 were WNL B  -MP    Pathomechanics    Pathomechanics Comments Special Tests: Loading/Compression test of the L spine, positive, Torsion L1-5 B negative, PA Shearing Tests, L1-3 negative, L4 and L5 positive with small amplituce.  SI tests were normal.  Dural Tests: Slump, positive, SLR L positive, R negative and Prone Bent Knee was B negative  -MP      User Key  (r) = Recorded By, (t) = Taken By, (c) = Cosigned By    Initials Name Provider Type    COTY Mcgraw PT Physical Therapist                Therapy Education       06/27/17 2994           Therapy Education    Given HEP  -MP      Program New  -MP      How Provided Written  -MP      Provided to Patient  -MP      Level of Understanding Teach back education performed   Lumbar bracing, in hooklying, and prone press ups were issued to begin his HEP.  -MP        User Key  (r) = Recorded By, (t) = Taken By, (c) = Cosigned By    Initials Name Provider Type    COTY Mcgraw PT Physical Therapist                PT OP Goals       06/27/17 1300       PT Short Term Goals    STG Date to Achieve 07/27/17  -MP     STG 1 Ammon reports that peak pain is reduced to 5/10 and is centrally located in the L spine and not in the L LE.  -MP     STG 1 Progress New  -MP     STG 2 Postural exercises are instructed to Ammon to reduce effects on soft tissue faulty posutural habits.  -MP     STG 2 Progress New  -MP     STG 3 Modified Oswestry disability is reduced to below 60%.  -MP     STG 3 Progress New  -MP     Long Term Goals    LTG Date to Achieve 08/25/17  -MP     LTG 1 L EHL MMT is equal to the right and displays no fatiguing weakness.  -MP     LTG 1 Progress New  -MP     LTG 2 Slump test and SLR test are negative.  -MP     LTG 2 Progress New  -MP     LTG 3 Ammon is independent with a complete HEP and self care education.  -MP     LTG 3 Progress New  -MP     LTG 4 Modified Oswestry disability is reduced to below 40%  -MP     LTG 4 Progress New  -MP     Time Calculation    PT Goal Re-Cert Due Date 07/27/17  -MP       User Key  (r) = Recorded By, (t) = Taken By, (c) = Cosigned By    Initials Name Provider Type    COTY Mcgraw PT Physical Therapist                PT Assessment/Plan       06/27/17 1322       PT Assessment    Functional Limitations Impaired gait;Impaired locomotion;Limitations in community activities;Limitations in functional capacity and performance;Performance in leisure activities;Performance in work activities  -MP     Impairments Pain;Muscle strength;Motor function;Range of  motion;Locomotion;Posture;Poor body mechanics;Joint mobility;Endurance  -MP     Assessment Comments Ammon presents today with symptoms consistent with L sciatica.  He tested positive to a lumbar spine loading/compression test, PA shearing at L4 and L5, fatiguing weakness at the L L5 myotome and has positve sciatica dural signs with Slump and L SLR test.  -MP     Please refer to paper survey for additional self-reported information Yes  -MP     Rehab Potential Good  -MP     Patient/caregiver participated in establishment of treatment plan and goals Yes  -MP     Patient would benefit from skilled therapy intervention Yes  -MP     PT Plan    PT Frequency --   1-2 x per week, due to his work schedule  -MP     Predicted Duration of Therapy Intervention (days/wks) 6-8 weeks  -MP     Planned CPT's? PT EVAL MOD COMPLELITY: 18914;PT RE-EVAL: 06732;PT MANUAL THERAPY EA 15 MIN: 60678;PT SELF CARE/HOME MGMT/TRAIN EA 15: 95017;PT THER PROC EA 15 MIN: 04823;PT AQUATIC THERAPY EA 15 MIN: 47056;PT ULTRASOUND EA 15 MIN: 55448;PT ELECTRICAL STIM UNATTEND:   -MP     PT Plan Comments Progess lumbar stabilization exercises and postural exercises next visit.  -MP       User Key  (r) = Recorded By, (t) = Taken By, (c) = Cosigned By    Initials Name Provider Type    COTY Mcgraw PT Physical Therapist                  Exercises       06/27/17 1300          Subjective Pain    Able to rate subjective pain? yes  -MP      Pre-Treatment Pain Level 3  -MP      Post-Treatment Pain Level 3  -MP      Exercise 1    Exercise Name 1 Therapeutic exercises, lumbar bracing, 10s x 10 and prone press ups x 10.  -MP        User Key  (r) = Recorded By, (t) = Taken By, (c) = Cosigned By    Initials Name Provider Type    COTY Mcgraw PT Physical Therapist                 Outcome Measures       06/27/17 1300          Modified Oswestry    Modified Oswestry Score/Comments 37/50, 74% disability  -MP      Functional Assessment    Outcome Measure Options  Irene García  -COTY        User Key  (r) = Recorded By, (t) = Taken By, (c) = Cosigned By    Initials Name Provider Type    COTY Mcgraw PT Physical Therapist            Time Calculation:   Start Time: 1145  Stop Time: 1230  Time Calculation (min): 45 min     Therapy Charges for Today     Code Description Service Date Service Provider Modifiers Qty    80816272047 HC PT EVAL MOD COMPLEXITY 2 6/27/2017 Serafin Mcgraw, PT GP 1    66639036472 HC PT THER PROC EA 15 MIN 6/27/2017 Serafin Mcgraw, PT GP 1          PT G-Codes  Outcome Measure Options: Millyd Raquel Mcgraw, PT  6/27/2017

## 2017-06-28 DIAGNOSIS — M51.26 LUMBAR DISC HERNIATION: Primary | ICD-10-CM

## 2017-06-30 ENCOUNTER — HOSPITAL ENCOUNTER (OUTPATIENT)
Dept: PHYSICAL THERAPY | Facility: HOSPITAL | Age: 42
Setting detail: THERAPIES SERIES
Discharge: HOME | End: 2017-06-30

## 2017-06-30 DIAGNOSIS — Z78.9 DIFFICULTY WITH ACTIVITIES OF DAILY LIVING: Primary | ICD-10-CM

## 2017-06-30 DIAGNOSIS — M53.86 SCIATICA ASSOCIATED WITH DISORDER OF LUMBAR SPINE: ICD-10-CM

## 2017-06-30 DIAGNOSIS — R26.2 DIFFICULTY WALKING: ICD-10-CM

## 2017-06-30 PROCEDURE — 97012 MECHANICAL TRACTION THERAPY: CPT | Performed by: PHYSICAL THERAPIST

## 2017-06-30 PROCEDURE — 97110 THERAPEUTIC EXERCISES: CPT | Performed by: PHYSICAL THERAPIST

## 2017-06-30 PROCEDURE — 97140 MANUAL THERAPY 1/> REGIONS: CPT | Performed by: PHYSICAL THERAPIST

## 2017-07-03 ENCOUNTER — TELEPHONE (OUTPATIENT)
Dept: INTERNAL MEDICINE | Facility: CLINIC | Age: 42
End: 2017-07-03

## 2017-07-03 ENCOUNTER — HOSPITAL ENCOUNTER (OUTPATIENT)
Dept: PHYSICAL THERAPY | Facility: HOSPITAL | Age: 42
Setting detail: THERAPIES SERIES
Discharge: HOME | End: 2017-07-03

## 2017-07-03 DIAGNOSIS — R26.2 DIFFICULTY WALKING: ICD-10-CM

## 2017-07-03 DIAGNOSIS — M53.86 SCIATICA ASSOCIATED WITH DISORDER OF LUMBAR SPINE: ICD-10-CM

## 2017-07-03 DIAGNOSIS — M54.10 RADICULAR PAIN OF LEFT LOWER EXTREMITY: Primary | ICD-10-CM

## 2017-07-03 DIAGNOSIS — M54.50 CHRONIC LOW BACK PAIN WITHOUT SCIATICA, UNSPECIFIED BACK PAIN LATERALITY: ICD-10-CM

## 2017-07-03 DIAGNOSIS — M51.36 DDD (DEGENERATIVE DISC DISEASE), LUMBAR: ICD-10-CM

## 2017-07-03 DIAGNOSIS — G89.29 CHRONIC LOW BACK PAIN WITHOUT SCIATICA, UNSPECIFIED BACK PAIN LATERALITY: ICD-10-CM

## 2017-07-03 DIAGNOSIS — Z78.9 DIFFICULTY WITH ACTIVITIES OF DAILY LIVING: Primary | ICD-10-CM

## 2017-07-03 PROCEDURE — 97012 MECHANICAL TRACTION THERAPY: CPT | Performed by: PHYSICAL THERAPIST

## 2017-07-03 PROCEDURE — G0283 ELEC STIM OTHER THAN WOUND: HCPCS | Performed by: PHYSICAL THERAPIST

## 2017-07-03 PROCEDURE — 97110 THERAPEUTIC EXERCISES: CPT | Performed by: PHYSICAL THERAPIST

## 2017-07-03 NOTE — THERAPY TREATMENT NOTE
Outpatient Physical Therapy Ortho Treatment Note  [facility]     Patient Name: Enzo Sewell  : 1975  MRN: [id number]  Today's Date: 7/3/2017      Visit Date: 2017    Visit Dx:    ICD-10-CM ICD-9-CM   1. Difficulty with activities of daily living R53.81 799.3   2. Sciatica associated with disorder of lumbar spine M53.9 724.3   3. Difficulty walking R26.2 719.7       Patient Active Problem List   Diagnosis   • Gastroesophageal reflux disease   • Cough variant asthma   • Hyperlipidemia   • Gilbert's syndrome   • Low back pain   • Rosacea   • Vitamin D deficiency   • Healthcare maintenance   • Environmental allergies   • Snoring   • Piriformis syndrome of left side   • DDD (degenerative disc disease), lumbar        Past Medical History:   Diagnosis Date   • Cough variant asthma     normal PFT's at baseline with positive bronchoprovocation study in    • DDD (degenerative disc disease), lumbar     s/p epidural in ; more distant hx of disc herniation to L S1 nerve root in  largely resorbed on  MRI.    • GERD (gastroesophageal reflux disease)    • Gilbert's syndrome     suspected with variable indirect elevations of bilirubin on labs   • Hyperlipidemia    • Lumbar radiculopathy     s/p epidural in    • Nephrolithiasis     s/p stent and retreival 3/2013   • Rosacea    • Vitamin D deficiency         Past Surgical History:   Procedure Laterality Date   • CYSTOSCOPY      with insertion of ureteral stent    • EYE SURGERY      lasik bilateral    • SEPTOPLASTY       with T&A   • TONSILLECTOMY AND ADENOIDECTOMY         • VASECTOMY                     PT Assessment/Plan       17 1810       PT Assessment    Assessment Comments Treatment seems to be helping for a few days.  He responds well to decompression and the low level postural exercises thus far.  He may be a candidate for a TENS unit and/or aqua therapy in future sessions.  -MP     PT Plan    PT Plan  Comments Monitor the effects of today's session.  If he feels the E stim is helpful a TENs unit may be prudent.  Consider aqua therapy.  -MP       User Key  (r) = Recorded By, (t) = Taken By, (c) = Cosigned By    Initials Name Provider Type    COTY Mcgraw, PT Physical Therapist                Modalities       07/03/17 1700          Traction 57932    Traction Type Lumbar  -MP      Rx Minutes 15  -MP      Duration Static  -MP      Position Supine   Bolster under knees  -MP      Weight --   60-70 lbs.  -MP        User Key  (r) = Recorded By, (t) = Taken By, (c) = Cosigned By    Initials Name Provider Type    COTY Mcgraw PT Physical Therapist                Exercises       07/03/17 1800          Subjective Comments    Subjective Comments Ammon had two really good days after his last session but last night, while sleeping, noticed pain increase in the L LE again.  -MP      Subjective Pain    Able to rate subjective pain? yes  -MP      Pre-Treatment Pain Level 5  -MP      Post-Treatment Pain Level 2  -MP      Subjective Pain Comment Ammon noticed pain be abolished from the R LB, and the L LBP became more centrailized and   -MP      Exercise 1    Exercise Name 1 Refer to land flow sheet  -MP      Exercise 2    Exercise Name 2 Progressed therapeutic exercises by adding wand flexion, hooklying, x 10.  -MP        User Key  (r) = Recorded By, (t) = Taken By, (c) = Cosigned By    Initials Name Provider Type    COTY Mcgraw, PT Physical Therapist                PT OP Goals       07/03/17 1800       PT Short Term Goals    STG Date to Achieve 07/27/17  -MP     STG 1 Ammon reports that peak pain is reduced to 5/10 and is centrally located in the L spine and not in the L LE.  -MP     STG 1 Progress Ongoing  -MP     STG 2 Postural exercises are instructed to Ammon to reduce effects on soft tissue faulty posutural habits.  -MP     STG 2 Progress Ongoing  -MP     STG 3 Modified Oswestry disability is reduced to below 60%.  -MP      STG 3 Progress Ongoing  -MP     Long Term Goals    LTG Date to Achieve 08/25/17  -MP     LTG 1 L EHL MMT is equal to the right and displays no fatiguing weakness.  -MP     LTG 1 Progress Ongoing  -MP     LTG 2 Slump test and SLR test are negative.  -MP     LTG 2 Progress Ongoing  -MP     LTG 3 Ammon is independent with a complete HEP and self care education.  -MP     LTG 3 Progress Ongoing  -MP     LTG 4 Modified Oswestry disability is reduced to below 40%  -MP     LTG 4 Progress Ongoing  -MP       User Key  (r) = Recorded By, (t) = Taken By, (c) = Cosigned By    Initials Name Provider Type    COTY Mcgraw PT Physical Therapist                Therapy Education       07/03/17 1809          Therapy Education    Given HEP  -MP      Program Progressed  -MP      How Provided Written  -MP      Provided to Patient  -MP      Level of Understanding Teach back education performed   Wand flexion, hooklying, was added to his HEP.  -MP        User Key  (r) = Recorded By, (t) = Taken By, (c) = Cosigned By    Initials Name Provider Type    COTY Mcgraw PT Physical Therapist        Time Calculation:   Start Time: 1640  Stop Time: 1745  Time Calculation (min): 65 min    Therapy Charges for Today     Code Description Service Date Service Provider Modifiers Qty    18304625174 HC PT TRACTION LUMBAR 7/3/2017 Serafin Mcgraw PT GP 1    84429302117 HC PT THER PROC EA 15 MIN 7/3/2017 Serafin Mcgraw PT GP 2    66609215075 HC PT ELECTRICAL STIM UNATTENDED 7/3/2017 Serafin Mcgraw PT  1          Serafin Mcgraw PT  7/3/2017

## 2017-07-03 NOTE — TELEPHONE ENCOUNTER
Patient called left a message for us to call him.     I return call and [facility]    Patient called back he spoke with Dr. García office today. They can't see him until mid part of August. Patient states he isn't sleeping more then two hours at a time. He states he needs some relief in then mean time. He would like to get in with pain management for injection in his back. He states any relief at this time would be great. I explained that you are not in the office until Thursday he stated to just give you the messages.

## 2017-07-05 PROBLEM — M54.10 RADICULAR PAIN OF LEFT LOWER EXTREMITY: Status: ACTIVE | Noted: 2017-07-05

## 2017-07-05 PROBLEM — G57.02 PIRIFORMIS SYNDROME OF LEFT SIDE: Status: RESOLVED | Noted: 2017-06-16 | Resolved: 2017-07-05

## 2017-07-10 ENCOUNTER — TELEPHONE (OUTPATIENT)
Dept: INTERNAL MEDICINE | Facility: CLINIC | Age: 42
End: 2017-07-10

## 2017-07-10 NOTE — TELEPHONE ENCOUNTER
I am not being dismissive, but I am concerned about pt anxiety over low back issue based on new complaints.  The fingertips should have nothing to do with pathology in low back and would be originating at completely different region of spine.   We can certainly try some gabapentin at night to see if helps and advance to TID if pt able to tolerate med.  Gabapentin can make people drowsy and affect gait, particularly at night, so must take cautiously.  This is nerve modulator med and is not a narcotic pain agent.    Please call in Gabapentin 300mg TID #90, NR   but discuss with pt to start with qhs dosing first and advance to TID only if needed and tolerable.  If pt finger issues persist, we will need to re-evaluate in office and, again, would not be related to low back pathology on MRI.

## 2017-07-10 NOTE — TELEPHONE ENCOUNTER
Pt called and stated that he is having numbness in his finger tips and limbs. He is scheduled for injections with pain management on the 21st and was wanting to know what could be done about the numbness in the meantime.

## 2017-07-11 RX ORDER — GABAPENTIN 300 MG/1
300 CAPSULE ORAL 3 TIMES DAILY
Qty: 90 CAPSULE | Refills: 1 | Status: SHIPPED | OUTPATIENT
Start: 2017-07-11 | End: 2017-08-25

## 2017-07-12 ENCOUNTER — HOSPITAL ENCOUNTER (OUTPATIENT)
Dept: PHYSICAL THERAPY | Facility: HOSPITAL | Age: 42
Setting detail: THERAPIES SERIES
Discharge: HOME | End: 2017-07-12

## 2017-07-12 DIAGNOSIS — M53.86 SCIATICA ASSOCIATED WITH DISORDER OF LUMBAR SPINE: ICD-10-CM

## 2017-07-12 DIAGNOSIS — Z78.9 DIFFICULTY WITH ACTIVITIES OF DAILY LIVING: Primary | ICD-10-CM

## 2017-07-12 DIAGNOSIS — R26.2 DIFFICULTY WALKING: ICD-10-CM

## 2017-07-12 PROCEDURE — 97110 THERAPEUTIC EXERCISES: CPT | Performed by: PHYSICAL THERAPIST

## 2017-07-12 PROCEDURE — 97012 MECHANICAL TRACTION THERAPY: CPT | Performed by: PHYSICAL THERAPIST

## 2017-07-12 NOTE — THERAPY TREATMENT NOTE
Outpatient Physical Therapy Ortho Treatment Note  [facility]     Patient Name: Enzo Sewell  : 1975  MRN: [id number]  Today's Date: 2017      Visit Date: 2017    Visit Dx:    ICD-10-CM ICD-9-CM   1. Difficulty with activities of daily living R53.81 799.3   2. Sciatica associated with disorder of lumbar spine M53.9 724.3   3. Difficulty walking R26.2 719.7       Patient Active Problem List   Diagnosis   • Gastroesophageal reflux disease   • Cough variant asthma   • Hyperlipidemia   • Gilbert's syndrome   • Low back pain   • Rosacea   • Vitamin D deficiency   • Healthcare maintenance   • Environmental allergies   • Snoring   • DDD (degenerative disc disease), lumbar   • Radicular pain of left lower extremity        Past Medical History:   Diagnosis Date   • Cough variant asthma     normal PFT's at baseline with positive bronchoprovocation study in    • DDD (degenerative disc disease), lumbar     s/p epidural in ; more distant hx of disc herniation to L S1 nerve root in  largely resorbed on  MRI.    • GERD (gastroesophageal reflux disease)    • Gilbert's syndrome     suspected with variable indirect elevations of bilirubin on labs   • Hyperlipidemia    • Lumbar radiculopathy     s/p epidural in    • Nephrolithiasis     s/p stent and retreival 3/2013   • Rosacea    • Vitamin D deficiency         Past Surgical History:   Procedure Laterality Date   • CYSTOSCOPY      with insertion of ureteral stent    • EYE SURGERY      lasik bilateral    • SEPTOPLASTY       with T&A   • TONSILLECTOMY AND ADENOIDECTOMY         • VASECTOMY                                   PT Assessment/Plan       17 1345       PT Assessment    Assessment Comments Patient feels lumbar traction is more beneficial than E-stim so opted to omit E-stim today.  He continues to report pain worse at night - still waking with L LE pain most nights b/ 2 and 4am.  He is scheduled for an  epidural next Friday.  He also reports episode of N/T in his hands last week after sleeping most of night on his side.    -RA     PT Plan    PT Plan Comments Continue with traction and advance ther ex per patient tolerance.  Consider aquatic therapy   -RA       User Key  (r) = Recorded By, (t) = Taken By, (c) = Cosigned By    Initials Name Provider Type    KANDY Moss, PT Physical Therapist                Modalities       07/12/17 1300          Traction 94598    Traction Type Lumbar  -RA      Rx Minutes 15  -RA      Duration Static  -RA      Position Supine   Bolster under knees  -RA      Weight --   60-70 lbs.  -RA        User Key  (r) = Recorded By, (t) = Taken By, (c) = Cosigned By    Initials Name Provider Type    KANDY Moss, PT Physical Therapist                Exercises       07/12/17 1300          Subjective Comments    Subjective Comments Pain in central LB and into L LE, worse at night.  Traction seems to help   -RA      Subjective Pain    Able to rate subjective pain? yes  -RA      Pre-Treatment Pain Level 5  -RA      Subjective Pain Comment 4-5/10  -RA      Exercise 1    Exercise Name 1 Refer to land flow sheet  -RA        User Key  (r) = Recorded By, (t) = Taken By, (c) = Cosigned By    Initials Name Provider Type    KANDY Moss PT Physical Therapist                                   Therapy Education       07/12/17 1400          Therapy Education    Given Symptoms/condition management;Posture/body mechanics  -RA      Program Reinforced  -RA      How Provided Verbal  -RA      Provided to Patient  -RA      Level of Understanding Teach back education performed  -RA        User Key  (r) = Recorded By, (t) = Taken By, (c) = Cosigned By    Initials Name Provider Type    KANDY Moss PT Physical Therapist                Time Calculation:   Start Time: 1330  Stop Time: 1415  Time Calculation (min): 45 min    Therapy Charges for Today     Code Description Service Date Service  Provider Modifiers Qty    64565087778  PT THER PROC EA 15 MIN 7/12/2017 Clarisse Moss, PT GP 2    [id number]  PT TRACTION LUMBAR 7/12/2017 Clarisse Moss, PT GP 1                    Clarisse Moss, PT  7/12/2017

## 2017-07-17 ENCOUNTER — OFFICE VISIT (OUTPATIENT)
Dept: NEUROSURGERY | Facility: CLINIC | Age: 42
End: 2017-07-17

## 2017-07-17 VITALS
BODY MASS INDEX: 27.54 KG/M2 | WEIGHT: 207.8 LBS | DIASTOLIC BLOOD PRESSURE: 77 MMHG | SYSTOLIC BLOOD PRESSURE: 114 MMHG | HEART RATE: 75 BPM | HEIGHT: 73 IN

## 2017-07-17 DIAGNOSIS — M51.16 DISPLACEMENT OF LUMBAR DISC WITH RADICULOPATHY: ICD-10-CM

## 2017-07-17 DIAGNOSIS — M51.36 DDD (DEGENERATIVE DISC DISEASE), LUMBAR: Primary | ICD-10-CM

## 2017-07-17 PROBLEM — M54.10 RADICULAR PAIN OF LEFT LOWER EXTREMITY: Status: RESOLVED | Noted: 2017-07-05 | Resolved: 2017-07-17

## 2017-07-17 PROCEDURE — 99244 OFF/OP CNSLTJ NEW/EST MOD 40: CPT | Performed by: PHYSICIAN ASSISTANT

## 2017-07-17 RX ORDER — CEFAZOLIN SODIUM 2 G/100ML
2 INJECTION, SOLUTION INTRAVENOUS ONCE
Status: CANCELLED | OUTPATIENT
Start: 2017-08-07 | End: 2017-07-17

## 2017-07-17 NOTE — PROGRESS NOTES
Subjective   Patient ID: Enzo Sewell is a 41 y.o. male is being seen for consultation today at the request of Nicki Coulter MD  for back and left leg pain. He denies any cause or injury.   He completed MDP with no relief.  He tried Cyclobenzaprine 10 mg but stopped taking it due to the way it made him feel.  Mr. Ascencio is currently going to PT 1-2 times a week with no relief. He had AMALIA in the past with mild relief. He is scheduled for a AMALIA this Friday. He takes Aleve 220 mg PRN and Gabapentin 300 mg HS for pain.    Back Pain   This is a chronic (10 years ) problem. The current episode started more than 1 year ago (3 months ). The problem has been rapidly worsening since onset. The pain is present in the lumbar spine. The pain radiates to the left knee, left thigh and left foot. The pain is at a severity of 7/10. The pain is moderate. The pain is worse during the night. The symptoms are aggravated by position and sitting. Associated symptoms include headaches, leg pain, numbness, tingling and weakness. Pertinent negatives include no bladder incontinence, bowel incontinence or perianal numbness. He has tried heat, ice, home exercises and bed rest (PT ) for the symptoms. The treatment provided mild relief.   Leg Pain    The pain is present in the left leg, left thigh, left foot and left knee. The pain is at a severity of 5/10. The pain is moderate. The pain has been constant since onset. Associated symptoms include numbness and tingling. He has tried ice, heat and rest (PT, last AMALIA 2011) for the symptoms. The treatment provided mild relief.       The following portions of the patient's history were reviewed and updated as appropriate: allergies, current medications, past family history, past medical history, past social history, past surgical history and problem list.    Review of Systems   Constitutional: Positive for activity change and fatigue.   Gastrointestinal: Positive for constipation. Negative for  bowel incontinence.   Genitourinary: Negative for bladder incontinence and difficulty urinating.   Musculoskeletal: Positive for back pain (left leg pain ).   Neurological: Positive for tingling, weakness, numbness and headaches.   Psychiatric/Behavioral: Positive for agitation, decreased concentration, dysphoric mood and sleep disturbance.   All other systems reviewed and are negative.      Objective   Physical Exam   Constitutional: He is oriented to person, place, and time. He appears well-developed and well-nourished.   HENT:   Head: Normocephalic and atraumatic.   Right Ear: External ear normal.   Left Ear: External ear normal.   Eyes: Conjunctivae and EOM are normal. Pupils are equal, round, and reactive to light. Right eye exhibits no discharge. Left eye exhibits no discharge.   Neck: Normal range of motion. Neck supple. No tracheal deviation present.   Pulmonary/Chest: Effort normal. No stridor. No respiratory distress.   Musculoskeletal: Normal range of motion. He exhibits no edema, tenderness or deformity.   Neurological: He is alert and oriented to person, place, and time. He has normal strength and normal reflexes. He displays no atrophy, no tremor and normal reflexes. No cranial nerve deficit or sensory deficit. He exhibits normal muscle tone. He displays a negative Romberg sign. He displays no seizure activity. Coordination and gait normal.   No long tract signs   Skin: Skin is warm and dry.   Psychiatric: He has a normal mood and affect. His behavior is normal. Judgment and thought content normal.   Nursing note and vitals reviewed.    Neurologic Exam     Mental Status   Oriented to person, place, and time.     Cranial Nerves     CN III, IV, VI   Pupils are equal, round, and reactive to light.  Extraocular motions are normal.     Motor Exam     Strength   Strength 5/5 throughout.       Assessment/Plan   Independent Review of Radiographic Studies:    Review the lumbar spine MRI from June 23, 2017.  It  does show mild facet arthropathy with a mild annular tear on the right at L4-L5.  There is also some facet arthropathy at L5-S1 but most significantly there is a large left disc herniation with severe left S1 nerve root compression.  Medical Decision Making:    Mr. Sewell was referred to us by Dr. Coulter for back and L leg pain.  The patient states that the pain started about 5 years ago initially.  At that point however it was really primarily just back pain with only rare episodes of mild radiculopathy.  He did epidural injections in 2008 in 2011 and did very well with only mild symptoms that he could manage by avoiding particular activities.  However in May he began having severe left buttock and posterior leg pain as well as increased low back pain.  The pain in the leg is severe and constant and worsens with any attempts to sit.  Standing or moving helps minimally but the pain is still incredibly limiting.  He denies focal weakness or incontinence.  He has had some physical therapy without relief and is scheduled for one epidural this Friday.    I did review the MRI with the patient and explained that given the severity of the nerve compression and shear size of the disc herniation it is not unreasonable to consider surgery.  He is incredibly uncomfortable and finding it difficult to tolerate the pain.  He understands that he could certainly try epidural injections but at this point feels that the pain is intolerable and would like to consider surgery.  I did review his MRI with Dr. García as well and he discussed surgery with the patient.  He explained there was approximately 85% to 90% chance of alleviating his radicular pain but that surgery was not intended to alleviate his more chronic mild low back pain.  He explained that he would have increased low back pain postoperatively but that it will improve with time.  He did discuss the procedure with him as well as the expected benefit and the fact there was a  2-3% at most risk of bleeding, infection, nerve damage.    This part is from Dr. García.  I did discuss the situation with the patient.  I told the patient about the risks, complications and expected outcome of the lumbar surgery.  I explained that there was an 85% chance of getting rid of the pain in the leg.  I explained that there would still be back pain after the surgery.  Initially this will be quite severe but will improve over time.  There is a 2 or 3% chance of infection, bleeding, CSF leak, damage to the nerve as a result of surgery, paralysis, as well as anesthetic risk.  There is a 5% chance of late instability which could require a fusion later on and a 10% chance of recurrent disc herniation.  We discussed the postoperative hospital and home course.    Mr. Sewell would like to proceed with surgery.  He will be scheduled for a:  Left L5-S1 laminectomy/discectomy with Metrx    Enzo was seen today for back pain and leg pain.    Diagnoses and all orders for this visit:    DDD (degenerative disc disease), lumbar    Displacement of lumbar disc with radiculopathy  -     Case Request; Standing  -     ceFAZolin (ANCEF) 2 g in sodium chloride 0.9 % 100 mL IVPB; Infuse 2 g into a venous catheter 1 (One) Time.  -     Case Request    Other orders  -     Follow anesthesia standing orders.  -     Obtain informed consent  -     Follow anesthesia standing orders.; Standing  -     RAYMOND hose- To be placed on patient in pre-op; Standing  -     SCD (sequential compression device)- to be placed on patient in Pre-op; Standing      Return for follow up after surgery (2wks postop), 2-3 week post op.

## 2017-07-19 ENCOUNTER — HOSPITAL ENCOUNTER (OUTPATIENT)
Dept: PHYSICAL THERAPY | Facility: HOSPITAL | Age: 42
Setting detail: THERAPIES SERIES
End: 2017-07-19

## 2017-08-01 ENCOUNTER — APPOINTMENT (OUTPATIENT)
Dept: PREADMISSION TESTING | Facility: HOSPITAL | Age: 42
End: 2017-08-01

## 2017-08-01 VITALS
HEART RATE: 86 BPM | BODY MASS INDEX: 27.43 KG/M2 | TEMPERATURE: 97.9 F | OXYGEN SATURATION: 98 % | RESPIRATION RATE: 16 BRPM | WEIGHT: 207 LBS | DIASTOLIC BLOOD PRESSURE: 76 MMHG | HEIGHT: 73 IN | SYSTOLIC BLOOD PRESSURE: 112 MMHG

## 2017-08-01 LAB
DEPRECATED RDW RBC AUTO: 42.2 FL (ref 37–54)
ERYTHROCYTE [DISTWIDTH] IN BLOOD BY AUTOMATED COUNT: 13.4 % (ref 11.5–14.5)
HCT VFR BLD AUTO: 47.3 % (ref 40.4–52.2)
HGB BLD-MCNC: 16.4 G/DL (ref 13.7–17.6)
MCH RBC QN AUTO: 30.3 PG (ref 27–32.7)
MCHC RBC AUTO-ENTMCNC: 34.7 G/DL (ref 32.6–36.4)
MCV RBC AUTO: 87.3 FL (ref 79.8–96.2)
PLATELET # BLD AUTO: 227 10*3/MM3 (ref 140–500)
PMV BLD AUTO: 9.8 FL (ref 6–12)
RBC # BLD AUTO: 5.42 10*6/MM3 (ref 4.6–6)
WBC NRBC COR # BLD: 8.95 10*3/MM3 (ref 4.5–10.7)

## 2017-08-01 PROCEDURE — 85027 COMPLETE CBC AUTOMATED: CPT | Performed by: NEUROLOGICAL SURGERY

## 2017-08-01 PROCEDURE — 36415 COLL VENOUS BLD VENIPUNCTURE: CPT

## 2017-08-01 RX ORDER — ALBUTEROL SULFATE 90 UG/1
2 AEROSOL, METERED RESPIRATORY (INHALATION) EVERY 4 HOURS PRN
COMMUNITY
End: 2022-12-06 | Stop reason: SINTOL

## 2017-08-01 RX ORDER — ATORVASTATIN CALCIUM 40 MG/1
40 TABLET, FILM COATED ORAL DAILY
COMMUNITY
End: 2017-12-09 | Stop reason: SDUPTHER

## 2017-08-01 NOTE — DISCHARGE INSTRUCTIONS
Take the following medications the morning of surgery with a small sip of water:  GABAPENTIN, ZANTAC, INHALERS    PT TO CALL OFFICE TO VERIFY ARRIVAL TIME      General Instructions:  • Do not eat solid food after midnight the night before surgery.  • You may drink clear liquids day of surgery but must stop at least one hour before your hospital arrival time.  • It is beneficial for you to have a clear drink that contains carbohydrates the day of surgery.  We suggest a 20 ounce bottle of Gatorade or Powerade for non-diabetic patients or a 20 ounce bottle of G2 or Powerade Zero for diabetic patients. (Pediatric patients, are not advised to drink a 20 ounce carbohydrate drink)    Clear liquids are liquids you can see through.  Nothing red in color.     Plain water                               Sports drinks  Sodas                                   Gelatin (Jell-O)  Fruit juices without pulp such as white grape juice and apple juice  Popsicles that contain no fruit or yogurt  Tea or coffee (no cream or milk added)  Gatorade / Powerade  G2 / Powerade Zero    • Infants may have breast milk up to four hours before surgery.  • Infants drinking formula may drink formula up to six hours before surgery.   • Patients who avoid smoking, chewing tobacco and alcohol for 4 weeks prior to surgery have a reduced risk of post-operative complications.  Quit smoking as many days before surgery as you can.  • Do not smoke, use chewing tobacco or drink alcohol the day of surgery.   • If applicable bring your C-PAP/ BI-PAP machine.  • Bring any papers given to you in the doctor’s office.  • Wear clean comfortable clothes and socks.  • Do not wear contact lenses or make-up.  Bring a case for your glasses.   • Bring crutches or walker if applicable.  • Leave all other valuables and jewelry at home.  • The Pre-Admission Testing nurse will instruct you to bring medications if unable to obtain an accurate list in Pre-Admission Testing.         If you were given a blood bank ID arm band remember to bring it with you the day of surgery.    Preventing a Surgical Site Infection:  • For 2 to 3 days before surgery, avoid shaving with a razor because the razor can irritate skin and make it easier to develop an infection.  • The night prior to surgery sleep in a clean bed with clean clothing.  Do not allow pets to sleep with you.  • Shower on the morning of surgery using a fresh bar of anti-bacterial soap (such as Dial) and clean washcloth.  Dry with a clean towel and dress in clean clothing.  • Ask your surgeon if you will be receiving antibiotics prior to surgery.  • Make sure you, your family, and all healthcare providers clean their hands with soap and water or an alcohol based hand  before caring for you or your wound.    Day of surgery:  Upon arrival, a Pre-op nurse and Anesthesiologist will review your health history, obtain vital signs, and answer questions you may have.  The only belongings needed at this time will be your home medications and if applicable your C-PAP/BI-PAP machine.  If you are staying overnight your family can leave the rest of your belongings in the car and bring them to your room later.  A Pre-op nurse will start an IV and you may receive medication in preparation for surgery, including something to help you relax.  Your family will be able to see you in the Pre-op area.  While you are in surgery your family should notify the waiting room  if they leave the waiting room area and provide a contact phone number.    Please be aware that surgery does come with discomfort.  We want to make every effort to control your discomfort so please discuss any uncontrolled symptoms with your nurse.   Your doctor will most likely have prescribed pain medications.      If you are going home after surgery you will receive individualized written care instructions before being discharged.  A responsible adult must drive you to  and from the hospital on the day of your surgery and stay with you for 24 hours.    If you are staying overnight following surgery, you will be transported to your hospital room following the recovery period.  [facility] has all private rooms.    If you have any questions please call Pre-Admission Testing at [phone].  Deductibles and co-payments are collected on the day of service. Please be prepared to pay the required co-pay, deductible or deposit on the day of service as defined by your plan.

## 2017-08-07 ENCOUNTER — APPOINTMENT (OUTPATIENT)
Dept: GENERAL RADIOLOGY | Facility: HOSPITAL | Age: 42
End: 2017-08-07

## 2017-08-07 ENCOUNTER — HOSPITAL ENCOUNTER (OUTPATIENT)
Facility: HOSPITAL | Age: 42
Setting detail: HOSPITAL OUTPATIENT SURGERY
Discharge: HOME | End: 2017-08-07
Attending: NEUROLOGICAL SURGERY | Admitting: NEUROLOGICAL SURGERY

## 2017-08-07 ENCOUNTER — ANESTHESIA EVENT (OUTPATIENT)
Dept: PERIOP | Facility: HOSPITAL | Age: 42
End: 2017-08-07

## 2017-08-07 ENCOUNTER — ANESTHESIA (OUTPATIENT)
Dept: PERIOP | Facility: HOSPITAL | Age: 42
End: 2017-08-07

## 2017-08-07 VITALS
SYSTOLIC BLOOD PRESSURE: 122 MMHG | TEMPERATURE: 97.8 F | RESPIRATION RATE: 16 BRPM | BODY MASS INDEX: 26.85 KG/M2 | HEART RATE: 64 BPM | DIASTOLIC BLOOD PRESSURE: 83 MMHG | HEIGHT: 73 IN | WEIGHT: 202.56 LBS | OXYGEN SATURATION: 98 %

## 2017-08-07 DIAGNOSIS — M51.16 DISPLACEMENT OF LUMBAR DISC WITH RADICULOPATHY: ICD-10-CM

## 2017-08-07 PROCEDURE — 25010000002 FENTANYL CITRATE (PF) 100 MCG/2ML SOLUTION: Performed by: NURSE ANESTHETIST, CERTIFIED REGISTERED

## 2017-08-07 PROCEDURE — 25010000003 CEFAZOLIN IN DEXTROSE 2-4 GM/100ML-% SOLUTION: Performed by: NEUROLOGICAL SURGERY

## 2017-08-07 PROCEDURE — 25010000002 HYDROMORPHONE PER 4 MG: Performed by: NURSE ANESTHETIST, CERTIFIED REGISTERED

## 2017-08-07 PROCEDURE — 25010000002 ONDANSETRON PER 1 MG: Performed by: NURSE ANESTHETIST, CERTIFIED REGISTERED

## 2017-08-07 PROCEDURE — 25010000002 PROPOFOL 10 MG/ML EMULSION: Performed by: NURSE ANESTHETIST, CERTIFIED REGISTERED

## 2017-08-07 PROCEDURE — 76000 FLUOROSCOPY <1 HR PHYS/QHP: CPT

## 2017-08-07 PROCEDURE — 25010000002 MIDAZOLAM PER 1 MG: Performed by: ANESTHESIOLOGY

## 2017-08-07 PROCEDURE — 25010000002 NEOSTIGMINE PER 0.5 MG: Performed by: NURSE ANESTHETIST, CERTIFIED REGISTERED

## 2017-08-07 PROCEDURE — 25010000002 DEXAMETHASONE PER 1 MG: Performed by: NURSE ANESTHETIST, CERTIFIED REGISTERED

## 2017-08-07 PROCEDURE — 72100 X-RAY EXAM L-S SPINE 2/3 VWS: CPT

## 2017-08-07 PROCEDURE — 63030 LAMOT DCMPRN NRV RT 1 LMBR: CPT | Performed by: NEUROLOGICAL SURGERY

## 2017-08-07 RX ORDER — SODIUM CHLORIDE 0.9 % (FLUSH) 0.9 %
1-10 SYRINGE (ML) INJECTION AS NEEDED
Status: DISCONTINUED | OUTPATIENT
Start: 2017-08-07 | End: 2017-08-07 | Stop reason: HOSPADM

## 2017-08-07 RX ORDER — DIPHENHYDRAMINE HYDROCHLORIDE 50 MG/ML
12.5 INJECTION INTRAMUSCULAR; INTRAVENOUS
Status: DISCONTINUED | OUTPATIENT
Start: 2017-08-07 | End: 2017-08-07 | Stop reason: HOSPADM

## 2017-08-07 RX ORDER — HYDROCODONE BITARTRATE AND ACETAMINOPHEN 7.5; 325 MG/1; MG/1
1 TABLET ORAL ONCE AS NEEDED
Status: COMPLETED | OUTPATIENT
Start: 2017-08-07 | End: 2017-08-07

## 2017-08-07 RX ORDER — NAPROXEN SODIUM 220 MG
220 TABLET ORAL 2 TIMES DAILY PRN
COMMUNITY
End: 2018-06-05

## 2017-08-07 RX ORDER — LABETALOL HYDROCHLORIDE 5 MG/ML
5 INJECTION, SOLUTION INTRAVENOUS
Status: DISCONTINUED | OUTPATIENT
Start: 2017-08-07 | End: 2017-08-07 | Stop reason: HOSPADM

## 2017-08-07 RX ORDER — MIDAZOLAM HYDROCHLORIDE 1 MG/ML
2 INJECTION INTRAMUSCULAR; INTRAVENOUS
Status: DISCONTINUED | OUTPATIENT
Start: 2017-08-07 | End: 2017-08-07 | Stop reason: HOSPADM

## 2017-08-07 RX ORDER — FENTANYL CITRATE 50 UG/ML
INJECTION, SOLUTION INTRAMUSCULAR; INTRAVENOUS AS NEEDED
Status: DISCONTINUED | OUTPATIENT
Start: 2017-08-07 | End: 2017-08-07 | Stop reason: SURG

## 2017-08-07 RX ORDER — ONDANSETRON 2 MG/ML
INJECTION INTRAMUSCULAR; INTRAVENOUS AS NEEDED
Status: DISCONTINUED | OUTPATIENT
Start: 2017-08-07 | End: 2017-08-07 | Stop reason: SURG

## 2017-08-07 RX ORDER — DEXAMETHASONE SODIUM PHOSPHATE 10 MG/ML
INJECTION INTRAMUSCULAR; INTRAVENOUS AS NEEDED
Status: DISCONTINUED | OUTPATIENT
Start: 2017-08-07 | End: 2017-08-07 | Stop reason: SURG

## 2017-08-07 RX ORDER — HYDROCODONE BITARTRATE AND ACETAMINOPHEN 5; 325 MG/1; MG/1
1 TABLET ORAL ONCE AS NEEDED
Status: DISCONTINUED | OUTPATIENT
Start: 2017-08-07 | End: 2017-08-07 | Stop reason: HOSPADM

## 2017-08-07 RX ORDER — PROMETHAZINE HYDROCHLORIDE 25 MG/1
25 TABLET ORAL ONCE AS NEEDED
Status: DISCONTINUED | OUTPATIENT
Start: 2017-08-07 | End: 2017-08-07 | Stop reason: HOSPADM

## 2017-08-07 RX ORDER — FLUMAZENIL 0.1 MG/ML
0.2 INJECTION INTRAVENOUS AS NEEDED
Status: DISCONTINUED | OUTPATIENT
Start: 2017-08-07 | End: 2017-08-07 | Stop reason: HOSPADM

## 2017-08-07 RX ORDER — SODIUM CHLORIDE, SODIUM LACTATE, POTASSIUM CHLORIDE, CALCIUM CHLORIDE 600; 310; 30; 20 MG/100ML; MG/100ML; MG/100ML; MG/100ML
9 INJECTION, SOLUTION INTRAVENOUS CONTINUOUS
Status: DISCONTINUED | OUTPATIENT
Start: 2017-08-07 | End: 2017-08-07 | Stop reason: HOSPADM

## 2017-08-07 RX ORDER — OXYCODONE AND ACETAMINOPHEN 7.5; 325 MG/1; MG/1
1 TABLET ORAL ONCE AS NEEDED
Status: DISCONTINUED | OUTPATIENT
Start: 2017-08-07 | End: 2017-08-07 | Stop reason: HOSPADM

## 2017-08-07 RX ORDER — PROMETHAZINE HYDROCHLORIDE 25 MG/1
12.5 TABLET ORAL ONCE AS NEEDED
Status: DISCONTINUED | OUTPATIENT
Start: 2017-08-07 | End: 2017-08-07 | Stop reason: HOSPADM

## 2017-08-07 RX ORDER — LIDOCAINE HYDROCHLORIDE 20 MG/ML
INJECTION, SOLUTION INFILTRATION; PERINEURAL AS NEEDED
Status: DISCONTINUED | OUTPATIENT
Start: 2017-08-07 | End: 2017-08-07 | Stop reason: SURG

## 2017-08-07 RX ORDER — HYDRALAZINE HYDROCHLORIDE 20 MG/ML
5 INJECTION INTRAMUSCULAR; INTRAVENOUS
Status: DISCONTINUED | OUTPATIENT
Start: 2017-08-07 | End: 2017-08-07 | Stop reason: HOSPADM

## 2017-08-07 RX ORDER — CEFAZOLIN SODIUM 2 G/100ML
2 INJECTION, SOLUTION INTRAVENOUS ONCE
Status: COMPLETED | OUTPATIENT
Start: 2017-08-07 | End: 2017-08-07

## 2017-08-07 RX ORDER — HYDROCODONE BITARTRATE AND ACETAMINOPHEN 5; 325 MG/1; MG/1
1 TABLET ORAL EVERY 4 HOURS PRN
Qty: 35 TABLET | Refills: 0
Start: 2017-08-07 | End: 2017-08-25

## 2017-08-07 RX ORDER — ROCURONIUM BROMIDE 10 MG/ML
INJECTION, SOLUTION INTRAVENOUS AS NEEDED
Status: DISCONTINUED | OUTPATIENT
Start: 2017-08-07 | End: 2017-08-07 | Stop reason: SURG

## 2017-08-07 RX ORDER — FAMOTIDINE 10 MG/ML
20 INJECTION, SOLUTION INTRAVENOUS ONCE
Status: COMPLETED | OUTPATIENT
Start: 2017-08-07 | End: 2017-08-07

## 2017-08-07 RX ORDER — PROMETHAZINE HYDROCHLORIDE 25 MG/ML
12.5 INJECTION, SOLUTION INTRAMUSCULAR; INTRAVENOUS ONCE AS NEEDED
Status: DISCONTINUED | OUTPATIENT
Start: 2017-08-07 | End: 2017-08-07 | Stop reason: HOSPADM

## 2017-08-07 RX ORDER — PROPOFOL 10 MG/ML
VIAL (ML) INTRAVENOUS AS NEEDED
Status: DISCONTINUED | OUTPATIENT
Start: 2017-08-07 | End: 2017-08-07 | Stop reason: SURG

## 2017-08-07 RX ORDER — FENTANYL CITRATE 50 UG/ML
50 INJECTION, SOLUTION INTRAMUSCULAR; INTRAVENOUS
Status: DISCONTINUED | OUTPATIENT
Start: 2017-08-07 | End: 2017-08-07 | Stop reason: HOSPADM

## 2017-08-07 RX ORDER — NALOXONE HCL 0.4 MG/ML
0.2 VIAL (ML) INJECTION AS NEEDED
Status: DISCONTINUED | OUTPATIENT
Start: 2017-08-07 | End: 2017-08-07 | Stop reason: HOSPADM

## 2017-08-07 RX ORDER — GLYCOPYRROLATE 0.2 MG/ML
INJECTION INTRAMUSCULAR; INTRAVENOUS AS NEEDED
Status: DISCONTINUED | OUTPATIENT
Start: 2017-08-07 | End: 2017-08-07 | Stop reason: SURG

## 2017-08-07 RX ORDER — HYDROMORPHONE HYDROCHLORIDE 1 MG/ML
0.5 INJECTION, SOLUTION INTRAMUSCULAR; INTRAVENOUS; SUBCUTANEOUS
Status: DISCONTINUED | OUTPATIENT
Start: 2017-08-07 | End: 2017-08-07 | Stop reason: HOSPADM

## 2017-08-07 RX ORDER — MIDAZOLAM HYDROCHLORIDE 1 MG/ML
1 INJECTION INTRAMUSCULAR; INTRAVENOUS
Status: DISCONTINUED | OUTPATIENT
Start: 2017-08-07 | End: 2017-08-07 | Stop reason: HOSPADM

## 2017-08-07 RX ORDER — EPHEDRINE SULFATE 50 MG/ML
5 INJECTION, SOLUTION INTRAVENOUS ONCE AS NEEDED
Status: DISCONTINUED | OUTPATIENT
Start: 2017-08-07 | End: 2017-08-07 | Stop reason: HOSPADM

## 2017-08-07 RX ORDER — ONDANSETRON 2 MG/ML
4 INJECTION INTRAMUSCULAR; INTRAVENOUS ONCE AS NEEDED
Status: DISCONTINUED | OUTPATIENT
Start: 2017-08-07 | End: 2017-08-07 | Stop reason: HOSPADM

## 2017-08-07 RX ORDER — PROMETHAZINE HYDROCHLORIDE 25 MG/1
25 SUPPOSITORY RECTAL ONCE AS NEEDED
Status: DISCONTINUED | OUTPATIENT
Start: 2017-08-07 | End: 2017-08-07 | Stop reason: HOSPADM

## 2017-08-07 RX ADMIN — HYDROCODONE BITARTRATE AND ACETAMINOPHEN 1 TABLET: 7.5; 325 TABLET ORAL at 12:11

## 2017-08-07 RX ADMIN — DEXAMETHASONE SODIUM PHOSPHATE 6 MG: 10 INJECTION INTRAMUSCULAR; INTRAVENOUS at 10:20

## 2017-08-07 RX ADMIN — SODIUM CHLORIDE, POTASSIUM CHLORIDE, SODIUM LACTATE AND CALCIUM CHLORIDE: 600; 310; 30; 20 INJECTION, SOLUTION INTRAVENOUS at 10:57

## 2017-08-07 RX ADMIN — LIDOCAINE HYDROCHLORIDE 100 MG: 20 INJECTION, SOLUTION INFILTRATION; PERINEURAL at 10:11

## 2017-08-07 RX ADMIN — ROCURONIUM BROMIDE 50 MG: 10 INJECTION INTRAVENOUS at 10:11

## 2017-08-07 RX ADMIN — FENTANYL CITRATE 50 MCG: 50 INJECTION INTRAMUSCULAR; INTRAVENOUS at 11:18

## 2017-08-07 RX ADMIN — FENTANYL CITRATE 50 MCG: 50 INJECTION INTRAMUSCULAR; INTRAVENOUS at 11:03

## 2017-08-07 RX ADMIN — HYDROMORPHONE HYDROCHLORIDE 0.5 MG: 1 INJECTION, SOLUTION INTRAMUSCULAR; INTRAVENOUS; SUBCUTANEOUS at 11:50

## 2017-08-07 RX ADMIN — FENTANYL CITRATE 50 MCG: 50 INJECTION INTRAMUSCULAR; INTRAVENOUS at 11:25

## 2017-08-07 RX ADMIN — ONDANSETRON 4 MG: 2 INJECTION INTRAMUSCULAR; INTRAVENOUS at 10:55

## 2017-08-07 RX ADMIN — HYDROMORPHONE HYDROCHLORIDE 0.5 MG: 1 INJECTION, SOLUTION INTRAMUSCULAR; INTRAVENOUS; SUBCUTANEOUS at 12:36

## 2017-08-07 RX ADMIN — NEOSTIGMINE METHYLSULFATE 3 MG: 1 INJECTION INTRAMUSCULAR; INTRAVENOUS; SUBCUTANEOUS at 10:57

## 2017-08-07 RX ADMIN — FAMOTIDINE 20 MG: 10 INJECTION, SOLUTION INTRAVENOUS at 08:05

## 2017-08-07 RX ADMIN — CEFAZOLIN SODIUM 2 G: 2 INJECTION, SOLUTION INTRAVENOUS at 10:21

## 2017-08-07 RX ADMIN — CEFAZOLIN SODIUM 1 G: 2 INJECTION, SOLUTION INTRAVENOUS at 10:54

## 2017-08-07 RX ADMIN — HYDROMORPHONE HYDROCHLORIDE 0.5 MG: 1 INJECTION, SOLUTION INTRAMUSCULAR; INTRAVENOUS; SUBCUTANEOUS at 11:33

## 2017-08-07 RX ADMIN — MIDAZOLAM 2 MG: 1 INJECTION INTRAMUSCULAR; INTRAVENOUS at 08:06

## 2017-08-07 RX ADMIN — FENTANYL CITRATE 50 MCG: 50 INJECTION INTRAMUSCULAR; INTRAVENOUS at 10:11

## 2017-08-07 RX ADMIN — SODIUM CHLORIDE, POTASSIUM CHLORIDE, SODIUM LACTATE AND CALCIUM CHLORIDE 9 ML/HR: 600; 310; 30; 20 INJECTION, SOLUTION INTRAVENOUS at 07:03

## 2017-08-07 RX ADMIN — PROPOFOL 200 MG: 10 INJECTION, EMULSION INTRAVENOUS at 10:11

## 2017-08-07 RX ADMIN — GLYCOPYRROLATE 0.5 MG: 0.2 INJECTION INTRAMUSCULAR; INTRAVENOUS at 10:57

## 2017-08-07 NOTE — BRIEF OP NOTE
LUMBAR DISCECTOMY POSTERIOR WITH METRIX  Procedure Note    Enzo Sewell  8/7/2017    Pre-op Diagnosis:   Displacement of lumbar disc with radiculopathy [M51.16]    Post-op Diagnosis:     Post-Op Diagnosis Codes:     * Displacement of lumbar disc with radiculopathy [M51.16]    Procedure/CPT® Codes:      Procedure(s):  Left L5-S1 laminectomy/discectomy with Metrix    Surgeon(s):  Corey García MD    Anesthesia: General    Staff:   Circulator: Phoebe Ospina RN  Radiology Technologist: Nirmala Jimenez RRT  Scrub Person: Shiv Meyer  Assistant: Kristen Barrera CSA  Orientee: Pamella Ventura    Estimated Blood Loss: *No blood loss documented*  Urine Voided: * No values recorded between 8/7/2017 10:07 AM and 8/7/2017 11:09 AM *    Specimens:                * No specimens in log *      Drains:           Findings: HNP on the left    Complications: none      Corey García MD     Date: 8/7/2017  Time: 11:09 AM

## 2017-08-07 NOTE — DISCHARGE INSTRUCTIONS
YOUR LAST PAIN PILL WAS AT 12:11 PM        LUMBAR SURGERY - [facility] M.D.  [address], [address]  [place] [address]  [phone]    Instructions & Care After Your Lumbar Surgery    1. No sitting except on the commode.  You may lie on a firm couch but not on a waterbed or recliner.  You may lie in any position that is comfortable, using only one pillow under your head. Either stand at a counter or lie on your side for meals. Three weeks after surgery you may begin sitting for 30 minutes 3 times per day.    2. No driving for three weeks.  You may ride in the car in a passenger seat that reclines or lying down in the back seat.      3. No bending. If you drop something allow someone else to pick it up.    4. Don’t lift anything heavier than a coffee cup or paperback book.    5. Gradually increase your activity each day.  You should get out of bed every hour during the day.  Walk outside as soon as you feel up to it.  Walk short distances frequently rather than making a long trip.  Frequency is more important than distance.  Your goal is to be walking 2 to 3 miles per day when you return for your post-operative visit. (Never do this in one trip.)    6. You may climb stairs.    7. Remove your bandage the second day after surgery and leave it off.  If you notice any redness, swelling or drainage, call the office.  There are steri-strips across the incision.  If these are still present ten days after surgery, remove them gently.      8. You may shower five days after surgery.  Keep the incision dry until then.  Don’t let the water beat directly on the incision and gently pat it dry.    9. Physical Therapy will be arranged at your post-operative visit if needed.    10. Your prescription for pain medication may be filled for half the original amount prior to your return office visit.  Due to changes in Federal Law in order to have this medication refilled you must contact the office four days prior to the  date and make arrangements to pick the prescription up in the office.  This prescription refill cannot be called in to the pharmacy. Your prescription will be ready for pick-up the day the refill is due.    Don’t be alarmed if you experience some of your pre-operative symptoms after going home.  This is not uncommon and normally goes away in a few days but may last longer.  If you have any questions or concerns, please call our office.

## 2017-08-07 NOTE — ANESTHESIA PREPROCEDURE EVALUATION
Anesthesia Evaluation     Patient summary reviewed   no history of anesthetic complications:  NPO Solid Status: > 8 hours  NPO Liquid Status: > 4 hours     Airway   Mallampati: III  TM distance: >3 FB  possible difficult intubation  Dental - normal exam     Pulmonary     breath sounds clear to auscultation  (+) asthma,   Cardiovascular - normal exam    Rhythm: regular  Rate: normal        Neuro/Psych  GI/Hepatic/Renal/Endo    (+)  GERD,     Musculoskeletal     Abdominal    Substance History      OB/GYN          Other                                        Anesthesia Plan    ASA 2     general     intravenous induction   Anesthetic plan and risks discussed with patient.

## 2017-08-07 NOTE — PLAN OF CARE
Problem: Patient Care Overview (Adult)  Goal: Plan of Care Review  Outcome: Ongoing (interventions implemented as appropriate)    08/07/17 1306   Coping/Psychosocial Response Interventions   Plan Of Care Reviewed With patient   Patient Care Overview   Progress progress toward functional goals as expected   Outcome Evaluation   Outcome Summary/Follow up Plan PATIENT VSS. PAIN LEVEL TOLERABLE. PATIENT TOLERATED PO. PO PAIN MEDICATION ADMINISTERED. PATIENT AMBULATED. GOING TO PHASE 2.         Problem: Perioperative Period (Pediatric)  Goal: Signs and Symptoms of Listed Potential Problems Will be Absent or Manageable (Perioperative Period)  Outcome: Ongoing (interventions implemented as appropriate)    08/07/17 1306   Perioperative Period   Problems Assessed (Perioperative Period) pain;wound complications;infection   Problems Present (Perioperative Period) pain

## 2017-08-07 NOTE — ANESTHESIA POSTPROCEDURE EVALUATION
"Patient: Enzo Sewlel    Procedure Summary     Date Anesthesia Start Anesthesia Stop Room / Location    08/07/17 1009 1118  [facility] / [facility]       Procedure Diagnosis Surgeon Provider    Left L5-S1 laminectomy/discectomy with Metrix (Left Spine Lumbar) Displacement of lumbar disc with radiculopathy  (Displacement of lumbar disc with radiculopathy [M51.16]) MD Mateo Jung MD          Anesthesia Type: general  Last vitals  BP   122/83 (08/07/17 1415)    Temp        Pulse   64 (08/07/17 1415)   Resp   16 (08/07/17 1415)    SpO2   98 % (08/07/17 1415)      Post Anesthesia Care and Evaluation    Patient location during evaluation: bedside  Patient participation: complete - patient participated  Level of consciousness: awake  Pain score: 2  Pain management: adequate  Airway patency: patent  Anesthetic complications: No anesthetic complications  PONV Status: none  Cardiovascular status: acceptable  Respiratory status: acceptable  Hydration status: acceptable    Comments: /83  Pulse 64  Temp 36.6 °C (97.8 °F) (Oral)   Resp 16  Ht 73\" (185.4 cm)  Wt 202 lb 9 oz (91.9 kg)  SpO2 98%  BMI 26.72 kg/m2        "

## 2017-08-07 NOTE — OP NOTE
Preop diagnosis: Herniated L5S1 disc on the left    Postop diagnosis: same    Procedure performed:  Left L5-S1 laminectomy and discectomy using Metrix, microsurgical technique and microsurgical instrumentation    Surgeon: Corey García M.D.    Assistant: Kristen Gómez CFA who was instrumental in helping with visualization of neural structures, hemostasis, and retraction of neural structures.    Indications for the procedure:  This is a patient with severe pain in the legs.  Previous imaging had shown neural compression at the L5S1 levels.  As a result of this and the failure of conservative therapy the patient elected to proceed with surgery.    Operative summary:  After induction of general anesthesia the patient was intubated and placed on the operating table in the prone position on a Justin table.  All pressure points were padded including peripheral points of entrapment.  The back was prepped with Chloraprep and then draped with Ioban, half sheets, and a split sheet.      The L5S1 level was localized with intraoperative fluoroscopy an incision was made on the left just above the pedicle.  Successive dilating tubes up to 18 mm by 5 cm were placed over that area.  Soft tissue was then removed from the supralaminar space.  The inferior laminar arch of L5 as well as the superior laminar arch of S1 and the medial aspect of facet were removed with the Talkray drill.  The remainder of the operation was done under high-power magnification of the operating microscope using microsurgical technique and microsurgical instrumentation.  The ligamentum flavum was opened and removed out to the level of the pedicle using the Kerrison rongeurs.  This exposed the lateral thecal sac and the nerve root of S1.  Once the lateral recess was opened the dissection was carried up to the L5 pedicle completely decompressing the superior nerve root.    Once this was done the S1 nerve root was mobilized medially to expose the disc.  There  was evidence of a large disc herniation compressing the nerve just under the nerve root.  The disc was fairly adherent to the nerve root and the nerve root had to be peeled off of the disc using microsurgical technique and microsurgical instrumentation The disc was carefully teased out and then the disc space was incised and disc material was removed with the down-biting cervical curettes and the pituitary rongeurs.  Once the disc space was emptied as much as possible bleeding was controlled with bipolar cautery.    Once the entire decompression was completed we were able to explore under the nerve root and the thecal sac using the Campbell ball probe to be sure there was no evidence of residual compression.there being none bleeding was controlled again using the bipolar cautery, FloSeal, and thrombin and Gelfoam.  The area was then copiously irrigated with bacitracin solution and the tube was removed. The paraspinous musculature was injected with 100 cc 1/8% Marcaine with 1:200,000 epinephrine solution.    Another gram of Kefzol was given prior to closure.    The incision was then closed in layersdressed and the patient was taken to the recovery room in stable condition there were no apparent complications.  Sponge, instrument, and needle counts were correct at the end of the procedure.

## 2017-08-14 ENCOUNTER — TELEPHONE (OUTPATIENT)
Dept: NEUROSURGERY | Facility: CLINIC | Age: 42
End: 2017-08-14

## 2017-08-18 ENCOUNTER — DOCUMENTATION (OUTPATIENT)
Dept: PHYSICAL THERAPY | Facility: HOSPITAL | Age: 42
End: 2017-08-18

## 2017-08-18 NOTE — THERAPY DISCHARGE NOTE
Outpatient Physical Therapy Ortho Discharge Note       Patient Name: Enzo Sewell  : 1975  MRN: [id number]  Today's Date: 2017      Visit Date: 2017    Patient Active Problem List   Diagnosis   • Gastroesophageal reflux disease   • Cough variant asthma   • Hyperlipidemia   • Gilbert's syndrome   • Rosacea   • Vitamin D deficiency   • Healthcare maintenance   • Environmental allergies   • Snoring   • DDD (degenerative disc disease), lumbar   • Displacement of lumbar disc with radiculopathy        Past Medical History:   Diagnosis Date   • Cough variant asthma     normal PFT's at baseline with positive bronchoprovocation study in    • DDD (degenerative disc disease), lumbar     s/p epidural in ; more distant hx of disc herniation to L S1 nerve root in  largely resorbed on  MRI.    • GERD (gastroesophageal reflux disease)    • Gilbert's syndrome     suspected with variable indirect elevations of bilirubin on labs   • History of kidney stones    • Hyperlipidemia    • Low back pain    • Lumbar radiculopathy     s/p epidural in    • Nephrolithiasis     s/p stent and retreival 3/2013   • Rosacea    • Vitamin D deficiency         Past Surgical History:   Procedure Laterality Date   • CYSTOSCOPY      with insertion of ureteral stent    • EPIDURAL BLOCK     • EYE SURGERY      lasik bilateral    • LUMBAR DISCECTOMY FUSION INSTRUMENTATION Left 2017    Procedure: Left L5-S1 laminectomy/discectomy with Metrix;  Surgeon: Corey García MD;  Location: [facility];  Service:    • SEPTOPLASTY       with T&A   • TONSILLECTOMY AND ADENOIDECTOMY         • VASECTOMY               Visit Dx:   No diagnosis found.            PT OP Goals       17 1200       PT Short Term Goals    STG Date to Achieve 17  -MP     STG 1 Ammon reports that peak pain is reduced to 5/10 and is centrally located in the L spine and not in the L LE.  -MP     STG 1 Progress Not  Met  -MP     STG 2 Postural exercises are instructed to Ammon to reduce effects on soft tissue faulty posutural habits.  -MP     STG 2 Progress Met  -MP     STG 3 Modified Oswestry disability is reduced to below 60%.  -MP     STG 3 Progress Not Met  -MP     Long Term Goals    LTG Date to Achieve 08/25/17  -MP     LTG 1 L EHL MMT is equal to the right and displays no fatiguing weakness.  -MP     LTG 1 Progress Not Met  -MP     LTG 2 Slump test and SLR test are negative.  -MP     LTG 2 Progress Not Met  -MP     LTG 3 Ammon is independent with a complete HEP and self care education.  -MP     LTG 3 Progress Not Met  -MP     LTG 4 Modified Oswestry disability is reduced to below 40%  -MP     LTG 4 Progress Not Met  -MP       User Key  (r) = Recorded By, (t) = Taken By, (c) = Cosigned By    Initials Name Provider Type    COTY Mcgraw, PT Physical Therapist          OP PT Discharge Summary  Date of Discharge: 08/18/17  Reason for Discharge: Non-compliant  Outcomes Achieved: Other (Patient abruptly stopped attending treatment.)  Discharge Destination: Unknown  Discharge Instructions: Mr. Sewell attended physical therapy for approximately 4 visits and stopped afterwards without calling in to explain why.        Serafin Mcgraw, PT  8/18/2017

## 2017-08-25 ENCOUNTER — OFFICE VISIT (OUTPATIENT)
Dept: NEUROSURGERY | Facility: CLINIC | Age: 42
End: 2017-08-25

## 2017-08-25 VITALS
SYSTOLIC BLOOD PRESSURE: 129 MMHG | DIASTOLIC BLOOD PRESSURE: 92 MMHG | WEIGHT: 218 LBS | HEIGHT: 73 IN | HEART RATE: 94 BPM | BODY MASS INDEX: 28.89 KG/M2

## 2017-08-25 DIAGNOSIS — Z09 SURGICAL FOLLOWUP VISIT: Primary | ICD-10-CM

## 2017-08-25 PROCEDURE — 99024 POSTOP FOLLOW-UP VISIT: CPT | Performed by: NURSE PRACTITIONER

## 2017-08-25 NOTE — PROGRESS NOTES
Subjective   Patient ID: Enzo Sewell is a 41 y.o. male is here today for follow-up. The patient is 2 weeks out from having left L5-S1 laminectomy and discectomy with Metrx, by Dr. García on 8/7/17. Pt has seen some improvement from surgery. Still having some nerve pain in left leg and tingling in both feet. Pt denies any incisional problems and incision looks clean and dry. HE is currently taking Tylenol over the counter prn for pain.    Back Pain   The problem has been gradually improving since onset. Associated symptoms include numbness. Pertinent negatives include no weakness.       The following portions of the patient's history were reviewed and updated as appropriate: allergies, current medications, past family history, past medical history, past social history, past surgical history and problem list.    Review of Systems   Musculoskeletal: Positive for back pain.   Neurological: Positive for numbness. Negative for weakness.   All other systems reviewed and are negative.      Objective   Physical Exam   Constitutional: He appears well-developed. No distress.   Neurological:   Patient is able to bear weight on heels and toes bilaterally.  Straight leg raise negative bilaterally.   Skin: Skin is warm and dry.   The lumbar incision is well approximated. No redness, swelling or drainage.    Psychiatric: He has a normal mood and affect.     Neurologic Exam    Assessment/Plan     Medical Decision Making:      Mr. Sewell is nearly 3 weeks out from left L5-S1 discectomy procedure with Dr. García.  He is doing well.  He is noticed significant improvement in the left leg pain.  He has no weakness on exam.    The lumbar incision is healing well.  Walking was encouraged.  Mr. Sewell will start some physical therapy and remain off work for now.  I will see him back in the office in 2 weeks.    Enzo was seen today for post-op.    Diagnoses and all orders for this visit:    Surgical followup visit  -     Ambulatory  Referral to Physical Therapy Evaluate and treat (for core and leg strengthening with intro to HEP)      Return in about 2 weeks (around 9/8/2017).

## 2017-09-06 ENCOUNTER — TELEPHONE (OUTPATIENT)
Dept: NEUROSURGERY | Facility: CLINIC | Age: 42
End: 2017-09-06

## 2017-09-06 NOTE — TELEPHONE ENCOUNTER
Patient is aware of the below, he states his PCP advised him he need to go to the urgent care for the cut on his nose. He will call us back with any further problems.

## 2017-09-06 NOTE — TELEPHONE ENCOUNTER
"Patient got up this morning to go to the restroom and felt nauseated and \" passed out and fell and hit the tub\" He states he has a cut on his nose from the fall. He is otherwise just nervous and anxious. He said he does not have any increased pain or weakness or any where. He has physical therapy scheduled today, should he still go to physical therapy? I did advise him he needs to contact his PCP, urgent care or ER regarding the cut on his nose and the episode of passing out. He understands that. He also understands if his symptoms worsen at all to go to the ER or urgent care immediately.   "

## 2017-09-12 ENCOUNTER — OFFICE VISIT (OUTPATIENT)
Dept: NEUROSURGERY | Facility: CLINIC | Age: 42
End: 2017-09-12

## 2017-09-12 VITALS
HEART RATE: 116 BPM | HEIGHT: 73 IN | SYSTOLIC BLOOD PRESSURE: 119 MMHG | DIASTOLIC BLOOD PRESSURE: 89 MMHG | WEIGHT: 218 LBS | BODY MASS INDEX: 28.89 KG/M2

## 2017-09-12 DIAGNOSIS — Z09 SURGICAL FOLLOWUP VISIT: Primary | ICD-10-CM

## 2017-09-12 PROCEDURE — 99024 POSTOP FOLLOW-UP VISIT: CPT | Performed by: NURSE PRACTITIONER

## 2017-09-12 RX ORDER — ACETAMINOPHEN 325 MG/1
650 TABLET ORAL
COMMUNITY
End: 2018-06-05

## 2017-09-12 NOTE — PROGRESS NOTES
Subjective   Patient ID: Enzo Sewell is a 41 y.o. male Back Pain   This is a chronic problem. The current episode started more than 1 year ago. The problem occurs intermittently. The problem has been gradually improving since onset. The pain is present in the lumbar spine. The quality of the pain is described as aching. The pain radiates to the left foot and right foot. The pain is at a severity of 0/10. The patient is experiencing no pain. The pain is the same all the time. The symptoms are aggravated by coughing and position. He has tried home exercises (Physical Therapy) for the symptoms. The treatment provided moderate relief.     Pt is here as Follow up from 2 weeks of Physical Therapy [facility]. Symptoms have improved. Patient is only taking Tylenol OTC for pain as needed  The following portions of the patient's history were reviewed and updated as appropriate: allergies, current medications, past family history, past medical history, past social history, past surgical history and problem list.     Review of Systems   Musculoskeletal: Positive for back pain.   All other systems reviewed and are negative.      Objective   Physical Exam   Constitutional: He is oriented to person, place, and time. He appears well-developed. No distress.   Neurological: He is alert and oriented to person, place, and time. No cranial nerve deficit.   Able to bear weight on heels and toes bilaterally.   Skin: Skin is warm and dry.   The lumbar incision is healed.      Psychiatric: He has a normal mood and affect.     Neurologic Exam     Mental Status   Oriented to person, place, and time.       Assessment/Plan     Medical Decision Making:      Mr Sewell is now 4 weeks out from surgery.  He continues to have no significant left leg pain or weakness.  His low back pain is also resolved.  He is pleased with his progress.  He unfortunately experienced a fall in his bathroom.  He was seen by his primary care physician   Dr. Witt for this.    In terms of his lumbar surgery, Mr. Sewell has made tremendous progress.  He is tolerating physical therapy well.  He will return to work on a reduced our schedule for a week.  He will progress to full-time hours after that.  He will continue with his physical therapy and follow-up with Dr. García in 6 weeks.  He will call us if he has any worsening symptoms before then.      Enzo was seen today for back pain.    Diagnoses and all orders for this visit:    Surgical followup visit      Return in about 6 weeks (around 10/24/2017).        Body mass index is 28.76 kg/(m^2).

## 2017-10-04 DIAGNOSIS — E55.9 VITAMIN D DEFICIENCY: ICD-10-CM

## 2017-10-04 DIAGNOSIS — E78.2 MIXED HYPERLIPIDEMIA: ICD-10-CM

## 2017-10-04 DIAGNOSIS — Z00.00 HEALTHCARE MAINTENANCE: Primary | ICD-10-CM

## 2017-10-04 DIAGNOSIS — E80.4 GILBERT'S SYNDROME: ICD-10-CM

## 2017-10-07 LAB
25(OH)D3+25(OH)D2 SERPL-MCNC: 35.5 NG/ML (ref 30–100)
ALBUMIN SERPL-MCNC: 4.8 G/DL (ref 3.5–5.2)
ALBUMIN/GLOB SERPL: 2.1 G/DL
ALP SERPL-CCNC: 64 U/L (ref 39–117)
ALT SERPL-CCNC: 17 U/L (ref 1–41)
APPEARANCE UR: CLEAR
AST SERPL-CCNC: 11 U/L (ref 1–40)
BACTERIA #/AREA URNS HPF: ABNORMAL /HPF
BASOPHILS # BLD AUTO: 0.05 10*3/MM3 (ref 0–0.2)
BASOPHILS NFR BLD AUTO: 0.9 % (ref 0–1.5)
BILIRUB SERPL-MCNC: 1.8 MG/DL (ref 0.1–1.2)
BILIRUB UR QL STRIP: NEGATIVE
BUN SERPL-MCNC: 14 MG/DL (ref 6–20)
BUN/CREAT SERPL: 12.2 (ref 7–25)
CALCIUM SERPL-MCNC: 10.1 MG/DL (ref 8.6–10.5)
CASTS URNS MICRO: ABNORMAL
CASTS URNS QL MICRO: PRESENT /LPF
CHLORIDE SERPL-SCNC: 103 MMOL/L (ref 98–107)
CHOLEST SERPL-MCNC: 144 MG/DL (ref 0–200)
CO2 SERPL-SCNC: 26.7 MMOL/L (ref 22–29)
COLOR UR: YELLOW
CREAT SERPL-MCNC: 1.15 MG/DL (ref 0.76–1.27)
CRYSTALS URNS MICRO: ABNORMAL
EOSINOPHIL # BLD AUTO: 0.28 10*3/MM3 (ref 0–0.7)
EOSINOPHIL NFR BLD AUTO: 5 % (ref 0.3–6.2)
EPI CELLS #/AREA URNS HPF: ABNORMAL /HPF
ERYTHROCYTE [DISTWIDTH] IN BLOOD BY AUTOMATED COUNT: 13.5 % (ref 11.5–14.5)
GLOBULIN SER CALC-MCNC: 2.3 GM/DL
GLUCOSE SERPL-MCNC: 99 MG/DL (ref 65–99)
GLUCOSE UR QL: NEGATIVE
HCT VFR BLD AUTO: 46 % (ref 40.4–52.2)
HDLC SERPL-MCNC: 36 MG/DL (ref 40–60)
HGB BLD-MCNC: 14.9 G/DL (ref 13.7–17.6)
HGB UR QL STRIP: NEGATIVE
IMM GRANULOCYTES # BLD: 0 10*3/MM3 (ref 0–0.03)
IMM GRANULOCYTES NFR BLD: 0 % (ref 0–0.5)
KETONES UR QL STRIP: NEGATIVE
LDLC SERPL CALC-MCNC: 82 MG/DL (ref 0–100)
LEUKOCYTE ESTERASE UR QL STRIP: NEGATIVE
LYMPHOCYTES # BLD AUTO: 1.9 10*3/MM3 (ref 0.9–4.8)
LYMPHOCYTES NFR BLD AUTO: 33.7 % (ref 19.6–45.3)
MCH RBC QN AUTO: 29.4 PG (ref 27–32.7)
MCHC RBC AUTO-ENTMCNC: 32.4 G/DL (ref 32.6–36.4)
MCV RBC AUTO: 90.9 FL (ref 79.8–96.2)
MICRO URNS: NORMAL
MICRO URNS: NORMAL
MONOCYTES # BLD AUTO: 0.53 10*3/MM3 (ref 0.2–1.2)
MONOCYTES NFR BLD AUTO: 9.4 % (ref 5–12)
MUCOUS THREADS URNS QL MICRO: PRESENT /HPF
NEUTROPHILS # BLD AUTO: 2.88 10*3/MM3 (ref 1.9–8.1)
NEUTROPHILS NFR BLD AUTO: 51 % (ref 42.7–76)
NITRITE UR QL STRIP: NEGATIVE
PH UR STRIP: 6 [PH] (ref 5–7.5)
PLATELET # BLD AUTO: 237 10*3/MM3 (ref 140–500)
POTASSIUM SERPL-SCNC: 5.2 MMOL/L (ref 3.5–5.2)
PROT SERPL-MCNC: 7.1 G/DL (ref 6–8.5)
PROT UR QL STRIP: NEGATIVE
RBC # BLD AUTO: 5.06 10*6/MM3 (ref 4.6–6)
RBC #/AREA URNS HPF: ABNORMAL /HPF
SODIUM SERPL-SCNC: 143 MMOL/L (ref 136–145)
SP GR UR: 1.02 (ref 1–1.03)
TRIGL SERPL-MCNC: 129 MG/DL (ref 0–150)
TSH SERPL DL<=0.005 MIU/L-ACNC: 0.84 MIU/ML (ref 0.27–4.2)
UNIDENT CRYS URNS QL MICRO: PRESENT /LPF
URINALYSIS REFLEX: NORMAL
UROBILINOGEN UR STRIP-MCNC: 0.2 MG/DL (ref 0.2–1)
VLDLC SERPL CALC-MCNC: 25.8 MG/DL (ref 5–40)
WBC # BLD AUTO: 5.64 10*3/MM3 (ref 4.5–10.7)
WBC #/AREA URNS HPF: ABNORMAL /HPF

## 2017-10-19 ENCOUNTER — OFFICE VISIT (OUTPATIENT)
Dept: INTERNAL MEDICINE | Facility: CLINIC | Age: 42
End: 2017-10-19

## 2017-10-19 VITALS
WEIGHT: 184 LBS | HEIGHT: 73 IN | BODY MASS INDEX: 24.39 KG/M2 | OXYGEN SATURATION: 98 % | HEART RATE: 118 BPM | TEMPERATURE: 98.6 F | DIASTOLIC BLOOD PRESSURE: 70 MMHG | SYSTOLIC BLOOD PRESSURE: 104 MMHG

## 2017-10-19 DIAGNOSIS — J45.991 COUGH VARIANT ASTHMA: ICD-10-CM

## 2017-10-19 DIAGNOSIS — Z00.00 HEALTHCARE MAINTENANCE: Primary | ICD-10-CM

## 2017-10-19 DIAGNOSIS — M51.36 DDD (DEGENERATIVE DISC DISEASE), LUMBAR: ICD-10-CM

## 2017-10-19 DIAGNOSIS — E55.9 VITAMIN D DEFICIENCY: ICD-10-CM

## 2017-10-19 DIAGNOSIS — R06.83 SNORING: ICD-10-CM

## 2017-10-19 DIAGNOSIS — L71.9 ROSACEA: ICD-10-CM

## 2017-10-19 DIAGNOSIS — Z91.09 ENVIRONMENTAL ALLERGIES: ICD-10-CM

## 2017-10-19 DIAGNOSIS — E80.4 GILBERT'S SYNDROME: ICD-10-CM

## 2017-10-19 DIAGNOSIS — M51.16 DISPLACEMENT OF LUMBAR DISC WITH RADICULOPATHY: ICD-10-CM

## 2017-10-19 DIAGNOSIS — K21.9 GASTROESOPHAGEAL REFLUX DISEASE, ESOPHAGITIS PRESENCE NOT SPECIFIED: ICD-10-CM

## 2017-10-19 DIAGNOSIS — E78.2 MIXED HYPERLIPIDEMIA: ICD-10-CM

## 2017-10-19 PROCEDURE — 99396 PREV VISIT EST AGE 40-64: CPT | Performed by: INTERNAL MEDICINE

## 2017-10-19 RX ORDER — IVERMECTIN 10 MG/G
CREAM TOPICAL
COMMUNITY
Start: 2017-09-27 | End: 2021-05-25

## 2017-10-19 NOTE — PROGRESS NOTES
"Annual Exam      HPI  Enzo Sewell is a 41 y.o. male RTC In yearly CPE, review of medical issues:   1. DDD at L4-L5 and hx of disc herniation now s/p Left L5-S1 laminectomy/discectomy with Metrx 8/7/17 with Dr. García - has seen PT and doing well. Has few more PT sessions. Off all pain meds at this point. \"My back is feeling better than it has in last 5-10 years\".   Has lost weight after surgery cutting out nighttime snack.  Pt notes he not longer has desire for nighttime snacking.    2. HLD - on Atorvastatin. No issues. No muscle aches noted.   3. Cough variant asthma - with positive bronchoprovocation study in 2013. \"Honestly, I have not had a breakout in years\".  Feels like weight loss has helped.  Still has Pro Air for PRN use. Quit using Symbicort since summer. 'I just quit wheezing and coughing\".   4. GERD - off all meds for years.  No reflux issues.    5. Rosacea - off oral doxycycline and topical metrogel. Saw derm and given sample of Soolantra and has been using it with success, but in last two weeks \"I have just completely broken out, I dont know what is going on\".    6. Vitamin D deficiency - on 1000 I.U. daily Vitamin D3 daily.  \"  7. Depressed Mood - has been doing OK. Still has lots of work stress but is \"managing\".          Review of Systems   Constitution: Positive for weight loss. Negative for chills and fever. Night sweats: intentional.   HENT: Negative for congestion, odynophagia and sore throat.    Cardiovascular: Positive for syncope (x 1, with urination, in 9/2017). Negative for chest pain, dyspnea on exertion, irregular heartbeat, leg swelling and palpitations.   Respiratory: Negative for cough, shortness of breath, sleep disturbances due to breathing and snoring (has stopped, per wife, now that lost weight post-op).    Skin: Positive for rash (rosacea in last 3 weeks). Negative for suspicious lesions.   Musculoskeletal: Positive for back pain. Negative for joint pain, joint swelling, " muscle cramps and muscle weakness.   Gastrointestinal: Negative for constipation, diarrhea, dysphagia, heartburn, nausea and vomiting.   Genitourinary: Negative for bladder incontinence, dysuria, frequency, hematuria and incomplete emptying.        Sees urology in 12/2017 with Dr. Becerra after past kidney stone     Neurological: Positive for paresthesias (in B feet after surgery, has been resolving slowly). Negative for excessive daytime sleepiness, dizziness, focal weakness, headaches and light-headedness.   Psychiatric/Behavioral: Negative for depression. The patient does not have insomnia and is not nervous/anxious.        Problem List:    Patient Active Problem List   Diagnosis   • Gastroesophageal reflux disease   • Cough variant asthma   • Hyperlipidemia   • Gilbert's syndrome   • Rosacea   • Vitamin D deficiency   • Environmental allergies   • DDD (degenerative disc disease), lumbar   • Displacement of lumbar disc with radiculopathy       Medical History:    Past Medical History:   Diagnosis Date   • Cough variant asthma     normal PFT's at baseline with positive bronchoprovocation study in 2013   • DDD (degenerative disc disease), lumbar     s/p epidural in 2013; more distant hx of disc herniation to L S1 nerve root in 2008 largely resorbed on 2013 MRI.    • GERD (gastroesophageal reflux disease)    • Gilbert's syndrome     suspected with variable indirect elevations of bilirubin on labs   • History of kidney stones    • Hyperlipidemia    • Low back pain    • Lumbar radiculopathy     s/p epidural in 2013   • Nephrolithiasis     s/p stent and retreival 3/2013   • Rosacea    • Snoring 10/7/2016    Resolved with weight loss in 2017; hx of uvulectomy and septoplasty in 2008   • Vitamin D deficiency         Social History:    Social History     Social History   • Marital status:      Spouse name: N/A   • Number of children: 2   • Years of education: [employer]STERS     Occupational History   • Assc Director of  "[employer]      Social History Main Topics   • Smoking status: Former Smoker   • Smokeless tobacco: Never Used      Comment: 1 pk/yr in past quit   • Alcohol use No      Comment: quit 05/2017; no hx of abuse   • Drug use: No   • Sexual activity: Yes     Partners: Female      Comment: wife only; no hx STD's     Other Topics Concern   • Not on file     Social History Narrative    Diet - \"normal\"; eats fruits and veggies; eating better     Exercise - PT maneuvers; walking 5 miles/ daily    Caffeine - 2-3 cups coffee daily       Family History:   Family History   Problem Relation Age of Onset   • Hyperlipidemia Mother    • Rosacea Mother    • Heart disease Father    • Prostate cancer Father    • Hyperlipidemia Father    • Nephrolithiasis Father    • Hyperlipidemia Brother    • Diabetes Brother    • No Known Problems Daughter    • Parkinsonism Paternal Grandmother    • Malig Hyperthermia Neg Hx        Surgical History:   Past Surgical History:   Procedure Laterality Date   • CYSTOSCOPY      with insertion of ureteral stent 2013   • EPIDURAL BLOCK     • EYE SURGERY      lasik bilateral 2013   • LUMBAR DISCECTOMY FUSION INSTRUMENTATION Left 8/7/2017    Procedure: Left L5-S1 laminectomy/discectomy with Metrix;  Surgeon: Corey García MD;  Location: [facility];  Service:    • SEPTOPLASTY      2008 with T&A   • TONSILLECTOMY AND ADENOIDECTOMY      2008   • VASECTOMY      2010         Current Outpatient Prescriptions:   •  acetaminophen (TYLENOL) 325 MG tablet, Take 650 mg by mouth., Disp: , Rfl:   •  albuterol (PROVENTIL HFA;VENTOLIN HFA) 108 (90 BASE) MCG/ACT inhaler, Inhale 2 puffs Every 4 (Four) Hours As Needed for Wheezing or Shortness of Air., Disp: , Rfl:   •  atorvastatin (LIPITOR) 40 MG tablet, Take 40 mg by mouth Daily., Disp: , Rfl:   •  budesonide-formoterol (SYMBICORT) 160-4.5 MCG/ACT inhaler, Inhale 2 puffs 2 (Two) Times a Day. (Patient taking differently: Inhale 2 puffs As Needed.), Disp: 10.2 g, Rfl: " 12  •  Cholecalciferol (VITAMIN D-3) 1000 UNITS capsule, Take 1,000 Units by mouth Daily., Disp: , Rfl:   •  naproxen sodium (ALEVE) 220 MG tablet, Take 220 mg by mouth 2 (Two) Times a Day As Needed for Mild Pain (1-3)., Disp: , Rfl:   •  ranitidine (ZANTAC) 75 MG tablet, Take 1 tablet by mouth Daily As Needed for Heartburn., Disp: , Rfl:   •  SOOLANTRA 1 % cream, , Disp: , Rfl:     Vitals:    10/19/17 1335   BP: 104/70   Pulse: 118   Temp: 98.6 °F (37 °C)   SpO2: 98%       Physical Exam   Constitutional: He is oriented to person, place, and time. He appears well-developed and well-nourished. He is cooperative. No distress.   HENT:   Head: Normocephalic and atraumatic.   Right Ear: Hearing, tympanic membrane, external ear and ear canal normal.   Left Ear: Hearing, tympanic membrane, external ear and ear canal normal.   Nose: Nose normal.   Mouth/Throat: Uvula is midline, oropharynx is clear and moist and mucous membranes are normal. No oropharyngeal exudate.   Eyes: Conjunctivae, EOM and lids are normal. Pupils are equal, round, and reactive to light. Right eye exhibits no discharge. Left eye exhibits no discharge. No scleral icterus.   Neck: Normal range of motion and full passive range of motion without pain. Neck supple. Carotid bruit is not present. No thyroid mass and no thyromegaly present.   Cardiovascular: Normal rate, regular rhythm, S1 normal, S2 normal and normal heart sounds.  Exam reveals no gallop and no friction rub.    No murmur heard.  Pulses:       Radial pulses are 2+ on the right side, and 2+ on the left side.        Dorsalis pedis pulses are 2+ on the right side, and 2+ on the left side.        Posterior tibial pulses are 2+ on the right side, and 2+ on the left side.   Pulmonary/Chest: Effort normal and breath sounds normal. No respiratory distress. He has no wheezes. He has no rhonchi. He has no rales.   Abdominal: Soft. Bowel sounds are normal. He exhibits no distension and no mass. There is  no hepatosplenomegaly. There is no tenderness. There is no rebound and no guarding.   Genitourinary: Rectum normal and prostate normal. Prostate is not enlarged and not tender.   Musculoskeletal: He exhibits no edema.        Lumbar back: He exhibits decreased range of motion (limited rotation, has to turn to side to lay back on table). He exhibits no swelling, no edema and no deformity.       Vascular Status -  His exam exhibits right foot vasculature abnormal and no right foot edema. His exam exhibits left foot vasculature abnormal and no left foot edema.  Lymphadenopathy:     He has no cervical adenopathy.     He has no axillary adenopathy.        Right: No inguinal adenopathy present.        Left: No inguinal adenopathy present.   Neurological: He is alert and oriented to person, place, and time. He has normal strength and normal reflexes. He displays no tremor. No cranial nerve deficit or sensory deficit. He exhibits normal muscle tone. Gait normal.   Skin: Skin is warm, dry and intact. Rash (erythema macular areas on B cheeks and bridge of nose with some closed comedones and mild skin scaling noted. ) noted.        Psychiatric: He has a normal mood and affect. His speech is normal and behavior is normal. Cognition and memory are normal.   Vitals reviewed.      Assessment/ Plan  Diagnoses and all orders for this visit:    Healthcare maintenance    Gastroesophageal reflux disease, esophagitis presence not specified    Cough variant asthma    Mixed hyperlipidemia    Gilbert's syndrome    Rosacea    Vitamin D deficiency    Environmental allergies    Displacement of lumbar disc with radiculopathy    DDD (degenerative disc disease), lumbar    Snoring    Other orders  -     SOOLANTRA 1 % cream;         Return in about 1 year (around 10/19/2018) for Annual physical.      Discussion:  Enzo ASIYA Sewell is a 41 y.o. male RTC In yearly CPE, review of medical issues:   1. DDD at L4-L5 and hx of disc herniation now s/p Left  "L5-S1 laminectomy/discectomy with Metrx 8/7/17 with Dr. García - doing well with resolution of pain and completing PT.  Strength and reflexes intact on exam.  2. HLD - LDL at goal on Atorvastatin. Low HDL persits and we discussed increasing CV exercise as much as possible as he is able. C/W TLC diet mods noted today.   3. Cough variant asthma - with positive bronchoprovocation study in 2013. Pt has self D/C'd daily inhalers at this time as sx went away with weight loss.  While this may indicate improved lung function with resolution of snoring, I do want pt to keep a low threshold for eval if has acute URI issues or increase in need for albuterol given he is off baseline inhaler.   4. GERD - resolved for multiple years, off meds.   5. Rosacea - off oral doxycycline and topical metrogel, now on Soolantra per derm.  Pt has marked flare of rosacea on exam and I did discuss restart of his oral doxycycline (already has Rx) however will delay start until after back from [place] next week as had sun reaction with med this summer.  Elect to f/u with derm if not better.   6. Vitamin D deficiency -  Replete on 1000 I.U. daily Vitamin D3 daily.  \"  7. Depressed Mood - controlled. Feels like handling stress well.   8. Snoring - s/p uvulectomy and septoplasty in 2008, but only recently resolved with weight loss. Monitor.   9. HM - labs d/w pt; Flu/ Tdap/ Hep B - UTD; JOE OK today (FHx prostate CA), d/w pt PSA at age 50; add back exercise as able    RTC one year CPE, F labs prior  "

## 2017-10-31 ENCOUNTER — OFFICE VISIT (OUTPATIENT)
Dept: NEUROSURGERY | Facility: CLINIC | Age: 42
End: 2017-10-31

## 2017-10-31 VITALS
DIASTOLIC BLOOD PRESSURE: 79 MMHG | BODY MASS INDEX: 24.39 KG/M2 | SYSTOLIC BLOOD PRESSURE: 119 MMHG | HEART RATE: 80 BPM | WEIGHT: 184 LBS | HEIGHT: 73 IN

## 2017-10-31 DIAGNOSIS — Z09 FOLLOW-UP EXAMINATION FOLLOWING SURGERY: Primary | ICD-10-CM

## 2017-10-31 PROCEDURE — 99024 POSTOP FOLLOW-UP VISIT: CPT | Performed by: NEUROLOGICAL SURGERY

## 2017-10-31 NOTE — PROGRESS NOTES
Subjective   Patient ID: Enzo Sewell is a 42 y.o. male is here today for follow-up. He is 12 weeks out from having a left L5-S1 laminectomy/discectomy with Metrx on 8/7/17 by Dr. García. He has been doing well. Still having some trouble with range and flexibility.    History of Present Illness    This patient returns today.  He has some occasional pain in his leg but mostly when he is very active or coughs or sneezes.  He otherwise is doing well.  He has some stiffness still in his back.    The following portions of the patient's history were reviewed and updated as appropriate: allergies, current medications, past family history, past medical history, past social history, past surgical history and problem list.    Review of Systems   Respiratory: Negative for shortness of breath.    Cardiovascular: Negative for chest pain.   All other systems reviewed and are negative.      Objective   Physical Exam   Constitutional: He is oriented to person, place, and time. He appears well-developed and well-nourished.   Neurological: He is oriented to person, place, and time.     Neurologic Exam     Mental Status   Oriented to person, place, and time.       Assessment/Plan   Independent Review of Radiographic Studies:      Medical Decision Making:      I told the patient that he can gradually return to normal activities.  He is to call me if any other problems develop.  He should avoid strenuously heavy lifting and anything strange his back and she will return to very slowly.    Enzo was seen today for post-op.    Diagnoses and all orders for this visit:    Follow-up examination following surgery    Return if symptoms worsen or fail to improve.

## 2017-12-11 RX ORDER — ATORVASTATIN CALCIUM 40 MG/1
TABLET, FILM COATED ORAL
Qty: 90 TABLET | Refills: 1 | Status: SHIPPED | OUTPATIENT
Start: 2017-12-11 | End: 2018-06-06

## 2018-06-05 ENCOUNTER — OFFICE VISIT (OUTPATIENT)
Dept: INTERNAL MEDICINE | Facility: CLINIC | Age: 43
End: 2018-06-05

## 2018-06-05 VITALS
DIASTOLIC BLOOD PRESSURE: 70 MMHG | TEMPERATURE: 98 F | HEART RATE: 69 BPM | WEIGHT: 184 LBS | BODY MASS INDEX: 24.39 KG/M2 | OXYGEN SATURATION: 99 % | HEIGHT: 73 IN | SYSTOLIC BLOOD PRESSURE: 118 MMHG

## 2018-06-05 DIAGNOSIS — E78.2 MIXED HYPERLIPIDEMIA: Primary | ICD-10-CM

## 2018-06-05 DIAGNOSIS — J45.991 COUGH VARIANT ASTHMA: ICD-10-CM

## 2018-06-05 DIAGNOSIS — L71.9 ROSACEA: ICD-10-CM

## 2018-06-05 PROCEDURE — 99214 OFFICE O/P EST MOD 30 MIN: CPT | Performed by: INTERNAL MEDICINE

## 2018-06-05 NOTE — PROGRESS NOTES
"Med Management (cholesterol )      HPI  Enzo Sewell is a 42 y.o. male RTC In f/u:  \"Started feeling bad in November with muscle aches\".  In January had the flu and so decided to make decision to go off all meds and \"detox\".  In last few months had a friend that  of heart disease and a few friends had panic issues.  Wife wants pt back on statin med.  Pt feels like needs to address this issue and make informed decision on what to do.  Fasting today for labs.  Exercise is basically \"10,000 steps in day at least\". No formal exercise otherwise.   - DDD at L4-L5 and hx of disc herniation now s/p Left L5-S1 laminectomy/discectomy with Metrx 17 with Dr. García - Feels like \"feeling better than I have in 10 years\".  But notes, feeling better \"both\" after surgery and off statin.  Notes has done well keeping weight in check. Not sure how much cholesterol med was contributing to past sx. doing well with resolution of pain and completing PT.  Strength and reflexes intact on exam.  - Cough variant asthma with positive bronchoprovocation study in  - pt is off all meds.  Has some coughing when in car and around environments that do not have clean air. Does not cough when in office or bedroom where he has air purifiers.  Is off Symbicort and albuterol and is ready to restart to see how he does and track use. Stopping inhalers was part of detox regimen that he undertook in late 2017.    - Rosacea - off oral doxycycline and Soolantra per derm but had a reflare of sx in last few weeks with sun exposure. Is planning to restart meds with derm.    - Depressed Mood - controlled. \"my mood is fine\".  Feels like doing better since pain is better in low back.  Always has stress with job but notes 'that is life'.      Review of Systems   Constitution: Negative for chills and fever.   Respiratory: Positive for cough (on occasion, exposure related. ). Negative for sputum production and wheezing.    Musculoskeletal: Negative for " "back pain, muscle cramps, muscle weakness and myalgias.       The following portions of the patient's history were reviewed and updated as appropriate: allergies, current medications, past medical history and problem list.      Current Outpatient Prescriptions:   •  Cholecalciferol (VITAMIN D-3) 1000 UNITS capsule, Take 1,000 Units by mouth Daily., Disp: , Rfl:   •  SOOLANTRA 1 % cream, , Disp: , Rfl:   •  albuterol (PROVENTIL HFA;VENTOLIN HFA) 108 (90 BASE) MCG/ACT inhaler, Inhale 2 puffs Every 4 (Four) Hours As Needed for Wheezing or Shortness of Air., Disp: , Rfl:   •  atorvastatin (LIPITOR) 40 MG tablet, TAKE 1 TABLET DAILY, Disp: 90 tablet, Rfl: 1  •  budesonide-formoterol (SYMBICORT) 160-4.5 MCG/ACT inhaler, Inhale 2 puffs 2 (Two) Times a Day. (Patient taking differently: Inhale 2 puffs As Needed.), Disp: 10.2 g, Rfl: 12  •  ORACEA 40 MG capsule, , Disp: , Rfl: 2    Vitals:    06/05/18 0820   BP: 118/70   Pulse: 69   Temp: 98 °F (36.7 °C)   SpO2: 99%   Weight: 83.5 kg (184 lb)   Height: 185.4 cm (73\")         Physical Exam   Constitutional: He is oriented to person, place, and time. He appears well-developed and well-nourished. No distress.   HENT:   Head: Normocephalic and atraumatic.   Neck: Normal range of motion. Neck supple. Carotid bruit is not present.   Cardiovascular: Normal rate, regular rhythm and normal heart sounds.    Pulses:       Carotid pulses are 2+ on the right side, and 2+ on the left side.       Radial pulses are 2+ on the right side, and 2+ on the left side.        Posterior tibial pulses are 2+ on the right side, and 2+ on the left side.   Pulmonary/Chest: Effort normal and breath sounds normal. No tachypnea. No respiratory distress. He has no decreased breath sounds. He has no wheezes. He has no rhonchi. He has no rales.   Musculoskeletal: He exhibits no edema.   Neurological: He is alert and oriented to person, place, and time. No cranial nerve deficit.   Skin:        Psychiatric: He " has a normal mood and affect. His behavior is normal.   Vitals reviewed.      Assessment/ Plan  Diagnoses and all orders for this visit:    Mixed hyperlipidemia  -     Comprehensive Metabolic Panel  -     Lipid Panel With LDL / HDL Ratio  -     High Sensitivity CRP    Rosacea    Cough variant asthma    Other orders  -     ORACEA 40 MG capsule;         Return for Next scheduled follow up.      Discussion:    Enzo Sewell is a 42 y.o. male RTC In f/u after recent cessation of all meds when not feeling well and around time of flu in late 201, early 2018.    1. DDD at L4-L5 and hx of disc herniation now s/p Left L5-S1 laminectomy/discectomy with Metrx 8/7/17 with Dr. García - doing very well, is nearly pain free. Pleased with progress.   2. HLD - off Atorvastatin but has some concerns about vascular health and cholesterol after friends death. I d/w pt updated guidelines on cholesterol management. Will check labs today and discuss statin meds in context of CR profile.  Will add hsCRP as pt is near age group where some benefit may be derived from adding hsCRP into decision tool for statin meds.  Will f/u with pt via phone after labs.   3. Cough variant asthma with positive bronchoprovocation study in 2013 - has some mild, occasaionl sx. Off Symbicort.  Pt to start with albuterol use PRN and track use. If using > 2-3 x/ week, would like pt start back on daily ICS.  Pt to call if questions on plan pending his tracking of albuterol use.   4. Rosacea - off oral doxycycline and Soolantra per derm, but has flare of rosacea on exam.  Will restart both meds per derm and trend sx.   5. Hx of Depressed Mood - doing well, mood is good. Feels better now that chronic pain resolved with low back.     RTC as scheduled.

## 2018-06-06 LAB
ALBUMIN SERPL-MCNC: 4.7 G/DL (ref 3.5–5.2)
ALBUMIN/GLOB SERPL: 2.5 G/DL
ALP SERPL-CCNC: 62 U/L (ref 39–117)
ALT SERPL-CCNC: 17 U/L (ref 1–41)
AST SERPL-CCNC: 11 U/L (ref 1–40)
BILIRUB SERPL-MCNC: 1.5 MG/DL (ref 0.1–1.2)
BUN SERPL-MCNC: 22 MG/DL (ref 6–20)
BUN/CREAT SERPL: 19.1 (ref 7–25)
CALCIUM SERPL-MCNC: 9.9 MG/DL (ref 8.6–10.5)
CHLORIDE SERPL-SCNC: 102 MMOL/L (ref 98–107)
CHOLEST SERPL-MCNC: 252 MG/DL (ref 0–200)
CO2 SERPL-SCNC: 26.2 MMOL/L (ref 22–29)
CREAT SERPL-MCNC: 1.15 MG/DL (ref 0.76–1.27)
CRP SERPL HS-MCNC: 3.03 MG/L (ref 0–3)
GFR SERPLBLD CREATININE-BSD FMLA CKD-EPI: 70 ML/MIN/1.73
GFR SERPLBLD CREATININE-BSD FMLA CKD-EPI: 85 ML/MIN/1.73
GLOBULIN SER CALC-MCNC: 1.9 GM/DL
GLUCOSE SERPL-MCNC: 96 MG/DL (ref 65–99)
HDLC SERPL-MCNC: 40 MG/DL (ref 40–60)
LDLC SERPL CALC-MCNC: 182 MG/DL (ref 0–100)
LDLC/HDLC SERPL: 4.56 {RATIO}
POTASSIUM SERPL-SCNC: 4.8 MMOL/L (ref 3.5–5.2)
PROT SERPL-MCNC: 6.6 G/DL (ref 6–8.5)
SODIUM SERPL-SCNC: 142 MMOL/L (ref 136–145)
TRIGL SERPL-MCNC: 148 MG/DL (ref 0–150)
VLDLC SERPL CALC-MCNC: 29.6 MG/DL (ref 5–40)

## 2018-06-06 RX ORDER — ATORVASTATIN CALCIUM 20 MG/1
20 TABLET, FILM COATED ORAL DAILY
Qty: 90 TABLET | Refills: 3 | Status: SHIPPED | OUTPATIENT
Start: 2018-06-06 | End: 2018-11-02

## 2018-06-07 RX ORDER — ATORVASTATIN CALCIUM 40 MG/1
TABLET, FILM COATED ORAL
Qty: 90 TABLET | Refills: 1 | OUTPATIENT
Start: 2018-06-07

## 2018-06-12 ENCOUNTER — TELEPHONE (OUTPATIENT)
Dept: INTERNAL MEDICINE | Facility: CLINIC | Age: 43
End: 2018-06-12

## 2018-10-15 DIAGNOSIS — E78.2 MIXED HYPERLIPIDEMIA: Primary | ICD-10-CM

## 2018-10-15 DIAGNOSIS — Z00.00 HEALTHCARE MAINTENANCE: ICD-10-CM

## 2018-10-15 DIAGNOSIS — E55.9 VITAMIN D DEFICIENCY: ICD-10-CM

## 2018-10-18 LAB
25(OH)D3+25(OH)D2 SERPL-MCNC: 30.3 NG/ML (ref 30–100)
ALBUMIN SERPL-MCNC: 5.1 G/DL (ref 3.5–5.2)
ALBUMIN/GLOB SERPL: 2.6 G/DL
ALP SERPL-CCNC: 66 U/L (ref 39–117)
ALT SERPL-CCNC: 16 U/L (ref 1–41)
APPEARANCE UR: CLEAR
AST SERPL-CCNC: 12 U/L (ref 1–40)
BACTERIA #/AREA URNS HPF: ABNORMAL /HPF
BASOPHILS # BLD AUTO: 0.06 10*3/MM3 (ref 0–0.2)
BASOPHILS NFR BLD AUTO: 0.8 % (ref 0–1.5)
BILIRUB SERPL-MCNC: 1.3 MG/DL (ref 0.1–1.2)
BILIRUB UR QL STRIP: NEGATIVE
BUN SERPL-MCNC: 20 MG/DL (ref 6–20)
BUN/CREAT SERPL: 17.9 (ref 7–25)
CALCIUM SERPL-MCNC: 10.1 MG/DL (ref 8.6–10.5)
CHLORIDE SERPL-SCNC: 105 MMOL/L (ref 98–107)
CHOLEST SERPL-MCNC: 226 MG/DL (ref 0–200)
CO2 SERPL-SCNC: 26.7 MMOL/L (ref 22–29)
COLOR UR: YELLOW
CREAT SERPL-MCNC: 1.12 MG/DL (ref 0.76–1.27)
CRYSTALS URNS MICRO: ABNORMAL
EOSINOPHIL # BLD AUTO: 0.31 10*3/MM3 (ref 0–0.7)
EOSINOPHIL NFR BLD AUTO: 4.1 % (ref 0.3–6.2)
EPI CELLS #/AREA URNS HPF: ABNORMAL /HPF
ERYTHROCYTE [DISTWIDTH] IN BLOOD BY AUTOMATED COUNT: 13.1 % (ref 11.5–14.5)
GLOBULIN SER CALC-MCNC: 2 GM/DL
GLUCOSE SERPL-MCNC: 95 MG/DL (ref 65–99)
GLUCOSE UR QL: NEGATIVE
HCT VFR BLD AUTO: 49.5 % (ref 40.4–52.2)
HDLC SERPL-MCNC: 40 MG/DL (ref 40–60)
HGB BLD-MCNC: 16.1 G/DL (ref 13.7–17.6)
HGB UR QL STRIP: NEGATIVE
IMM GRANULOCYTES # BLD: 0.02 10*3/MM3 (ref 0–0.03)
IMM GRANULOCYTES NFR BLD: 0.3 % (ref 0–0.5)
KETONES UR QL STRIP: NEGATIVE
LDLC SERPL CALC-MCNC: 151 MG/DL (ref 0–100)
LEUKOCYTE ESTERASE UR QL STRIP: NEGATIVE
LYMPHOCYTES # BLD AUTO: 2.74 10*3/MM3 (ref 0.9–4.8)
LYMPHOCYTES NFR BLD AUTO: 36.1 % (ref 19.6–45.3)
MCH RBC QN AUTO: 28.9 PG (ref 27–32.7)
MCHC RBC AUTO-ENTMCNC: 32.5 G/DL (ref 32.6–36.4)
MCV RBC AUTO: 88.9 FL (ref 79.8–96.2)
MICRO URNS: NORMAL
MICRO URNS: NORMAL
MONOCYTES # BLD AUTO: 0.6 10*3/MM3 (ref 0.2–1.2)
MONOCYTES NFR BLD AUTO: 7.9 % (ref 5–12)
MUCOUS THREADS URNS QL MICRO: PRESENT /HPF
NEUTROPHILS # BLD AUTO: 3.87 10*3/MM3 (ref 1.9–8.1)
NEUTROPHILS NFR BLD AUTO: 50.8 % (ref 42.7–76)
NITRITE UR QL STRIP: NEGATIVE
PH UR STRIP: 5.5 [PH] (ref 5–7.5)
PLATELET # BLD AUTO: 238 10*3/MM3 (ref 140–500)
POTASSIUM SERPL-SCNC: 5 MMOL/L (ref 3.5–5.2)
PROT SERPL-MCNC: 7.1 G/DL (ref 6–8.5)
PROT UR QL STRIP: NEGATIVE
RBC # BLD AUTO: 5.57 10*6/MM3 (ref 4.6–6)
RBC #/AREA URNS HPF: ABNORMAL /HPF
SODIUM SERPL-SCNC: 144 MMOL/L (ref 136–145)
SP GR UR: 1.02 (ref 1–1.03)
TRIGL SERPL-MCNC: 175 MG/DL (ref 0–150)
TSH SERPL DL<=0.005 MIU/L-ACNC: 1.14 MIU/ML (ref 0.27–4.2)
UNIDENT CRYS URNS QL MICRO: PRESENT /LPF
URINALYSIS REFLEX: NORMAL
UROBILINOGEN UR STRIP-MCNC: 0.2 MG/DL (ref 0.2–1)
VLDLC SERPL CALC-MCNC: 35 MG/DL (ref 5–40)
WBC # BLD AUTO: 7.6 10*3/MM3 (ref 4.5–10.7)
WBC #/AREA URNS HPF: ABNORMAL /HPF

## 2018-11-02 ENCOUNTER — OFFICE VISIT (OUTPATIENT)
Dept: INTERNAL MEDICINE | Facility: CLINIC | Age: 43
End: 2018-11-02

## 2018-11-02 VITALS
HEART RATE: 71 BPM | OXYGEN SATURATION: 99 % | SYSTOLIC BLOOD PRESSURE: 110 MMHG | BODY MASS INDEX: 24.52 KG/M2 | HEIGHT: 73 IN | DIASTOLIC BLOOD PRESSURE: 78 MMHG | TEMPERATURE: 97.8 F | WEIGHT: 185 LBS

## 2018-11-02 DIAGNOSIS — E78.2 MIXED HYPERLIPIDEMIA: ICD-10-CM

## 2018-11-02 DIAGNOSIS — Z91.09 ENVIRONMENTAL ALLERGIES: ICD-10-CM

## 2018-11-02 DIAGNOSIS — M51.36 DDD (DEGENERATIVE DISC DISEASE), LUMBAR: ICD-10-CM

## 2018-11-02 DIAGNOSIS — J45.991 COUGH VARIANT ASTHMA: ICD-10-CM

## 2018-11-02 DIAGNOSIS — Z00.00 HEALTHCARE MAINTENANCE: Primary | ICD-10-CM

## 2018-11-02 DIAGNOSIS — L71.9 ROSACEA: ICD-10-CM

## 2018-11-02 DIAGNOSIS — K21.9 GASTROESOPHAGEAL REFLUX DISEASE, ESOPHAGITIS PRESENCE NOT SPECIFIED: ICD-10-CM

## 2018-11-02 DIAGNOSIS — E55.9 VITAMIN D DEFICIENCY: ICD-10-CM

## 2018-11-02 PROBLEM — M51.16 DISPLACEMENT OF LUMBAR DISC WITH RADICULOPATHY: Status: RESOLVED | Noted: 2017-07-17 | Resolved: 2018-11-02

## 2018-11-02 PROCEDURE — 99396 PREV VISIT EST AGE 40-64: CPT | Performed by: INTERNAL MEDICINE

## 2018-11-02 NOTE — PROGRESS NOTES
"Annual Exam (CPE ) and Hyperlipidemia      HPI  Enzo Sewell is a 43 y.o. male RTC In yearly CPE, review of medical issues:   Health ahs been good.   1. DDD at L4-L5 and hx of disc herniation now s/p Left L5-S1 laminectomy/discectomy with Metrx 8/7/17 with Dr. García - doing very well, is nearly pain free. No issues. Has no f/u planned.   2. HLD with elevated hsCRP - is back on Atorvastatin.  Notes that muscle ache issue is \"not as much\" compared to higher dosage used in past. However, still feels like things are different than they were when off med. No myalgia issues with med.    3. Cough variant asthma with positive bronchoprovocation study in 2013, sx improved with weight loss - Is off inhalers.  Feels like does not need to use it.  \"I have never felt like I had asthma.... I know I had the test\".  Really is not having some cough issues at this time.   4. Rosacea - \"controlled\".  Still has issues with diet related flares. Is still on Soolantra, but will use doxycycline with flare.   5. Hx of Depressed Mood - doing well, mood is good. Feels better now that chronic pain resolved with low back.   6. GERD - resolved years ago, better off atorvastatin and feels like sx came back a bit after started back on atrovastatin.  No overt reflux of acid to stomach. NO issues swallowing.  Not taking any meds for sx as not bad enough.      7. Vitamin D deficiency - on 1000 I.U. daily Vitamin D3 daily.         Review of Systems   Constitution: Negative for chills, fever, malaise/fatigue, weight gain and weight loss.   HENT: Negative for congestion, hearing loss, odynophagia and sore throat.    Eyes: Negative for discharge, double vision, pain and redness.        Last eye exam years ago; no glasses     Cardiovascular: Negative for chest pain, dyspnea on exertion, irregular heartbeat, leg swelling, near-syncope, palpitations and syncope.   Respiratory: Negative for cough and shortness of breath.    Endocrine: Negative for " polydipsia, polyphagia and polyuria.   Hematologic/Lymphatic: Negative for bleeding problem. Does not bruise/bleed easily.   Skin: Positive for color change (with rosacea). Negative for rash and suspicious lesions.   Musculoskeletal: Negative for joint pain, joint swelling, muscle cramps, muscle weakness and myalgias.   Gastrointestinal: Positive for heartburn. Negative for bloating, abdominal pain, constipation, diarrhea, dysphagia, nausea and vomiting.   Genitourinary: Negative for dysuria, frequency, hematuria, hesitancy and nocturia.   Neurological: Negative for dizziness, headaches and light-headedness.   Psychiatric/Behavioral: Negative for depression. The patient does not have insomnia and is not nervous/anxious.    Allergic/Immunologic: Negative for environmental allergies and persistent infections.       Problem List:    Patient Active Problem List   Diagnosis   • Gastroesophageal reflux disease   • Cough variant asthma   • Hyperlipidemia   • Gilbert's syndrome   • Rosacea   • Vitamin D deficiency   • Environmental allergies   • DDD (degenerative disc disease), lumbar       Medical History:    Past Medical History:   Diagnosis Date   • Cough variant asthma     normal PFT's at baseline with positive bronchoprovocation study in 2013   • DDD (degenerative disc disease), lumbar     s/p epidural in 2013; more distant hx of disc herniation to L S1 nerve root in 2008 largely resorbed on 2013 MRI.    • Displacement of lumbar disc with radiculopathy 7/17/2017    S/p L5-S1 discectomy   • GERD (gastroesophageal reflux disease)    • Gilbert's syndrome     suspected with variable indirect elevations of bilirubin on labs   • History of kidney stones    • Hyperlipidemia    • Low back pain    • Lumbar radiculopathy     s/p epidural in 2013   • Nephrolithiasis     s/p stent and retreival 3/2013   • Rosacea    • Snoring 10/7/2016    Resolved with weight loss in 2017; hx of uvulectomy and septoplasty in 2008   • Vitamin D  "deficiency         Social History:    Social History     Social History   • Marital status:      Spouse name: N/A   • Number of children: 2   • Years of education: [employer]     Occupational History   • [employer] of [employer]      Social History Main Topics   • Smoking status: Former Smoker   • Smokeless tobacco: Never Used      Comment: 1 pk/yr in past quit   • Alcohol use No      Comment: quit 05/2017; no hx of abuse   • Drug use: No   • Sexual activity: Yes     Partners: Female      Comment: wife only; no hx STD's     Other Topics Concern   • Not on file     Social History Narrative    Diet - \"normal\"; eats fruits and veggies; eating better     Exercise - PT maneuvers; walking 5 miles/ daily    Caffeine - 2-3 cups coffee daily       Family History:   Family History   Problem Relation Age of Onset   • Hyperlipidemia Mother    • Rosacea Mother    • Heart disease Father    • Prostate cancer Father    • Hyperlipidemia Father    • Nephrolithiasis Father    • Hyperlipidemia Brother    • Diabetes Brother    • No Known Problems Daughter    • Parkinsonism Paternal Grandmother    • Malig Hyperthermia Neg Hx        Surgical History:   Past Surgical History:   Procedure Laterality Date   • CYSTOSCOPY      with insertion of ureteral stent 2013   • EPIDURAL BLOCK     • EYE SURGERY      lasik bilateral 2013   • LUMBAR DISCECTOMY FUSION INSTRUMENTATION Left 8/7/2017    Procedure: Left L5-S1 laminectomy/discectomy with Metrix;  Surgeon: Corey García MD;  Location: [facility];  Service:    • SEPTOPLASTY      2008 with T&A   • TONSILLECTOMY AND ADENOIDECTOMY      2008   • VASECTOMY      2010         Current Outpatient Prescriptions:   •  Cholecalciferol (VITAMIN D-3) 1000 UNITS capsule, Take 1,000 Units by mouth Daily., Disp: , Rfl:   •  ORACEA 40 MG capsule, , Disp: , Rfl: 2  •  SOOLANTRA 1 % cream, , Disp: , Rfl:   •  albuterol (PROVENTIL HFA;VENTOLIN HFA) 108 (90 BASE) MCG/ACT inhaler, Inhale 2 puffs Every " 4 (Four) Hours As Needed for Wheezing or Shortness of Air., Disp: , Rfl:   •  budesonide-formoterol (SYMBICORT) 160-4.5 MCG/ACT inhaler, Inhale 2 puffs 2 (Two) Times a Day. (Patient taking differently: Inhale 2 puffs As Needed.), Disp: 10.2 g, Rfl: 12  •  pitavastatin calcium (LIVALO) 2 MG tablet tablet, Take 1 tablet by mouth Every Night., Disp: 28 tablet, Rfl: 0    Vitals:    11/02/18 1258   BP: 110/78   Pulse: 71   Temp: 97.8 °F (36.6 °C)   SpO2: 99%       Physical Exam   Constitutional: He is oriented to person, place, and time. He appears well-developed and well-nourished. He is cooperative. No distress.   HENT:   Head: Normocephalic and atraumatic.   Right Ear: Hearing, tympanic membrane, external ear and ear canal normal.   Left Ear: Hearing, tympanic membrane, external ear and ear canal normal.   Nose: Nose normal.   Mouth/Throat: Uvula is midline, oropharynx is clear and moist and mucous membranes are normal. No oropharyngeal exudate.   Eyes: Pupils are equal, round, and reactive to light. Conjunctivae, EOM and lids are normal. Right eye exhibits no discharge. Left eye exhibits no discharge. No scleral icterus.   Neck: Normal range of motion and full passive range of motion without pain. Neck supple. Carotid bruit is not present. No thyroid mass and no thyromegaly present.   Cardiovascular: Normal rate, regular rhythm, S1 normal, S2 normal and normal heart sounds.  Exam reveals no gallop and no friction rub.    No murmur heard.  Pulses:       Radial pulses are 2+ on the right side, and 2+ on the left side.        Dorsalis pedis pulses are 2+ on the right side, and 2+ on the left side.        Posterior tibial pulses are 2+ on the right side, and 2+ on the left side.   Pulmonary/Chest: Effort normal and breath sounds normal. No respiratory distress. He has no wheezes. He has no rhonchi. He has no rales.   Abdominal: Soft. Bowel sounds are normal. He exhibits no distension and no mass. There is no  hepatosplenomegaly. There is no tenderness. There is no rebound and no guarding.   Genitourinary: Rectum normal and prostate normal. Prostate is not enlarged and not tender.   Musculoskeletal: Normal range of motion. He exhibits no edema.     Vascular Status -  His right foot exhibits normal foot vasculature  and no edema. His left foot exhibits normal foot vasculature  and no edema.  Skin Integrity  -  His right foot skin is intact.His left foot skin is intact..  Lymphadenopathy:     He has no cervical adenopathy.     He has no axillary adenopathy.        Right: No inguinal adenopathy present.        Left: No inguinal adenopathy present.   Neurological: He is alert and oriented to person, place, and time. He has normal strength and normal reflexes. He displays no tremor. No cranial nerve deficit or sensory deficit. He exhibits normal muscle tone. Gait normal.   Skin: Skin is warm, dry and intact. No rash (diffuse erythema in malar distribution B with interspersed closed comedones throughout) noted.        Psychiatric: He has a normal mood and affect. His speech is normal and behavior is normal. Thought content normal. Cognition and memory are normal.   Vitals reviewed.      Assessment/ Plan  Diagnoses and all orders for this visit:    Healthcare maintenance    Cough variant asthma    DDD (degenerative disc disease), lumbar    Environmental allergies    Gastroesophageal reflux disease, esophagitis presence not specified    Mixed hyperlipidemia  -     pitavastatin calcium (LIVALO) 2 MG tablet tablet; Take 1 tablet by mouth Every Night.    Rosacea    Vitamin D deficiency        Return in about 1 year (around 11/2/2019) for Annual physical.      Discussion:  Enzo Sewell is a 43 y.o. male RTC In yearly CPE, review of medical issues:    1. DDD at L4-L5 and hx of disc herniation now s/p Left L5-S1 laminectomy/discectomy with Metrx 8/7/17 with Dr. García - doing very well, nearly pain free. No f/u planned for Nsgy  unless sx arise.   2. HLD with moderate CV risk and elevated hsCRP - back on Atorvastatin and doing well overall, though some vague return of reflux/ dyspepsia sx.  No myalgias and LFT's are OK.  Dyspepsia is listed as side effect for Atorvastatin and is not for Livalo, so will trial drug washout period of 3 weeks and then start of 28 day sample trial of Livalo 2mg. Pt to call and will discuss how sx are on Livalo and if better, will call in Rx (has copay card with sample).    3. Cough variant asthma with positive bronchoprovocation study in 2013 - off inhalers and no use of PRN rescue. Pt really does no embrace dx and feels like has not sx at this time.  4. Rosacea - on Soolantra daily per derm, with PRN doxycycline for flares. In midst of flare currently.  Tx per derm.   5. Hx of Depressed Mood - doing well, mood is good despite stress at work.   6. GERD - resolved years ago and with weight loss.  However, sx seem to be returning on atorvastatin, as noted above. Will trial statin change to see if can resolve sx.   7. Vitamin D deficiency - replete on 1000 I.U. daily Vitamin D3 daily. C/W same.   8. Snoring - resolved s/p uvulectomy and septoplasty in 2008 and with weight loss. [place].  9. HM - labs d/w pt; Flu/ Tdap/ Hep B/ Hep A #1 - UTD; JOE OK today (FHx prostate CA), d/w pt PSA at age 50; add back exercise as able    RTC in one year CPE, F labs prior

## 2019-02-13 DIAGNOSIS — E78.2 MIXED HYPERLIPIDEMIA: ICD-10-CM

## 2019-02-14 RX ORDER — PITAVASTATIN CALCIUM 2.09 MG/1
TABLET, FILM COATED ORAL
Qty: 28 TABLET | Refills: 3 | Status: SHIPPED | OUTPATIENT
Start: 2019-02-14 | End: 2019-07-18 | Stop reason: SDUPTHER

## 2019-07-18 DIAGNOSIS — E78.2 MIXED HYPERLIPIDEMIA: ICD-10-CM

## 2019-11-01 DIAGNOSIS — E55.9 VITAMIN D DEFICIENCY: ICD-10-CM

## 2019-11-01 DIAGNOSIS — Z00.00 HEALTHCARE MAINTENANCE: Primary | ICD-10-CM

## 2019-11-01 DIAGNOSIS — E80.4 GILBERT'S SYNDROME: ICD-10-CM

## 2019-11-01 DIAGNOSIS — E78.2 MIXED HYPERLIPIDEMIA: ICD-10-CM

## 2019-11-06 LAB
25(OH)D3+25(OH)D2 SERPL-MCNC: 34.7 NG/ML (ref 30–100)
ALBUMIN SERPL-MCNC: 4.7 G/DL (ref 3.5–5.2)
ALBUMIN/GLOB SERPL: 2.5 G/DL
ALP SERPL-CCNC: 62 U/L (ref 39–117)
ALT SERPL-CCNC: 18 U/L (ref 1–41)
APPEARANCE UR: CLEAR
AST SERPL-CCNC: 16 U/L (ref 1–40)
BACTERIA #/AREA URNS HPF: ABNORMAL /HPF
BASOPHILS # BLD AUTO: 0.08 10*3/MM3 (ref 0–0.2)
BASOPHILS NFR BLD AUTO: 1.4 % (ref 0–1.5)
BILIRUB SERPL-MCNC: 1.3 MG/DL (ref 0.2–1.2)
BILIRUB UR QL STRIP: NEGATIVE
BUN SERPL-MCNC: 16 MG/DL (ref 6–20)
BUN/CREAT SERPL: 13.4 (ref 7–25)
CALCIUM SERPL-MCNC: 9.5 MG/DL (ref 8.6–10.5)
CHLORIDE SERPL-SCNC: 106 MMOL/L (ref 98–107)
CHOLEST SERPL-MCNC: 222 MG/DL (ref 0–200)
CO2 SERPL-SCNC: 26.4 MMOL/L (ref 22–29)
COLOR UR: YELLOW
CREAT SERPL-MCNC: 1.19 MG/DL (ref 0.76–1.27)
CRYSTALS URNS MICRO: ABNORMAL
EOSINOPHIL # BLD AUTO: 0.27 10*3/MM3 (ref 0–0.4)
EOSINOPHIL NFR BLD AUTO: 4.7 % (ref 0.3–6.2)
EPI CELLS #/AREA URNS HPF: ABNORMAL /HPF
ERYTHROCYTE [DISTWIDTH] IN BLOOD BY AUTOMATED COUNT: 13.4 % (ref 12.3–15.4)
GLOBULIN SER CALC-MCNC: 1.9 GM/DL
GLUCOSE SERPL-MCNC: 98 MG/DL (ref 65–99)
GLUCOSE UR QL: NEGATIVE
HCT VFR BLD AUTO: 44.5 % (ref 37.5–51)
HDLC SERPL-MCNC: 42 MG/DL (ref 40–60)
HGB BLD-MCNC: 15.2 G/DL (ref 13–17.7)
HGB UR QL STRIP: NEGATIVE
IMM GRANULOCYTES # BLD AUTO: 0.01 10*3/MM3 (ref 0–0.05)
IMM GRANULOCYTES NFR BLD AUTO: 0.2 % (ref 0–0.5)
KETONES UR QL STRIP: NEGATIVE
LDLC SERPL CALC-MCNC: 150 MG/DL (ref 0–100)
LEUKOCYTE ESTERASE UR QL STRIP: NEGATIVE
LYMPHOCYTES # BLD AUTO: 2.24 10*3/MM3 (ref 0.7–3.1)
LYMPHOCYTES NFR BLD AUTO: 38.6 % (ref 19.6–45.3)
MCH RBC QN AUTO: 29.7 PG (ref 26.6–33)
MCHC RBC AUTO-ENTMCNC: 34.2 G/DL (ref 31.5–35.7)
MCV RBC AUTO: 86.9 FL (ref 79–97)
MICRO URNS: NORMAL
MICRO URNS: NORMAL
MONOCYTES # BLD AUTO: 0.43 10*3/MM3 (ref 0.1–0.9)
MONOCYTES NFR BLD AUTO: 7.4 % (ref 5–12)
MUCOUS THREADS URNS QL MICRO: PRESENT /HPF
NEUTROPHILS # BLD AUTO: 2.77 10*3/MM3 (ref 1.7–7)
NEUTROPHILS NFR BLD AUTO: 47.7 % (ref 42.7–76)
NITRITE UR QL STRIP: NEGATIVE
NRBC BLD AUTO-RTO: 0 /100 WBC (ref 0–0.2)
PH UR STRIP: 5 [PH] (ref 5–7.5)
PLATELET # BLD AUTO: 213 10*3/MM3 (ref 140–450)
POTASSIUM SERPL-SCNC: 4.8 MMOL/L (ref 3.5–5.2)
PROT SERPL-MCNC: 6.6 G/DL (ref 6–8.5)
PROT UR QL STRIP: NEGATIVE
RBC # BLD AUTO: 5.12 10*6/MM3 (ref 4.14–5.8)
RBC #/AREA URNS HPF: ABNORMAL /HPF
SODIUM SERPL-SCNC: 143 MMOL/L (ref 136–145)
SP GR UR: 1.02 (ref 1–1.03)
TRIGL SERPL-MCNC: 150 MG/DL (ref 0–150)
TSH SERPL DL<=0.005 MIU/L-ACNC: 0.71 UIU/ML (ref 0.27–4.2)
UNIDENT CRYS URNS QL MICRO: PRESENT /LPF
URINALYSIS REFLEX: NORMAL
UROBILINOGEN UR STRIP-MCNC: 0.2 MG/DL (ref 0.2–1)
VLDLC SERPL CALC-MCNC: 30 MG/DL
WBC # BLD AUTO: 5.8 10*3/MM3 (ref 3.4–10.8)
WBC #/AREA URNS HPF: ABNORMAL /HPF

## 2019-11-08 ENCOUNTER — OFFICE VISIT (OUTPATIENT)
Dept: INTERNAL MEDICINE | Facility: CLINIC | Age: 44
End: 2019-11-08

## 2019-11-08 VITALS
TEMPERATURE: 97.7 F | RESPIRATION RATE: 12 BRPM | SYSTOLIC BLOOD PRESSURE: 118 MMHG | HEART RATE: 69 BPM | BODY MASS INDEX: 24.65 KG/M2 | OXYGEN SATURATION: 98 % | WEIGHT: 182 LBS | HEIGHT: 72 IN | DIASTOLIC BLOOD PRESSURE: 70 MMHG

## 2019-11-08 DIAGNOSIS — K21.9 GASTROESOPHAGEAL REFLUX DISEASE, ESOPHAGITIS PRESENCE NOT SPECIFIED: ICD-10-CM

## 2019-11-08 DIAGNOSIS — Z91.09 ENVIRONMENTAL ALLERGIES: ICD-10-CM

## 2019-11-08 DIAGNOSIS — E80.4 GILBERT'S SYNDROME: ICD-10-CM

## 2019-11-08 DIAGNOSIS — J45.991 COUGH VARIANT ASTHMA: ICD-10-CM

## 2019-11-08 DIAGNOSIS — Z00.00 HEALTHCARE MAINTENANCE: Primary | ICD-10-CM

## 2019-11-08 DIAGNOSIS — E55.9 VITAMIN D DEFICIENCY: ICD-10-CM

## 2019-11-08 DIAGNOSIS — R07.89 CHEST PRESSURE: ICD-10-CM

## 2019-11-08 DIAGNOSIS — M51.36 DDD (DEGENERATIVE DISC DISEASE), LUMBAR: ICD-10-CM

## 2019-11-08 DIAGNOSIS — E78.2 MIXED HYPERLIPIDEMIA: ICD-10-CM

## 2019-11-08 DIAGNOSIS — L71.9 ROSACEA: ICD-10-CM

## 2019-11-08 PROCEDURE — 93000 ELECTROCARDIOGRAM COMPLETE: CPT | Performed by: INTERNAL MEDICINE

## 2019-11-08 PROCEDURE — 99396 PREV VISIT EST AGE 40-64: CPT | Performed by: INTERNAL MEDICINE

## 2019-11-08 RX ORDER — FAMOTIDINE 20 MG/1
20 TABLET, FILM COATED ORAL 2 TIMES DAILY PRN
Start: 2019-11-08 | End: 2021-05-25

## 2019-11-08 NOTE — PROGRESS NOTES
"Annual Exam (review of medical issues )      HPI  Enzo Sewell is a 44 y.o. male RTC in yearly CPE, review of medical issues:    Has been doing well. Health is \"fine\".  Stress level is up, but doing well overall.   1. DDD at L4-L5 and hx of disc herniation now s/p Left L5-S1 laminectomy/discectomy with Metrx 8/7/17 with Dr. García - \"Back is good\".  Weight is back up a bit and feels like needs to get htat back to control. Was 10# down over the summer.   2. HLD with moderate CV risk and elevated hsCRP - on Livalo. \"Much better than the...ya\".  Is feeling like has less achiness and all off  Atorvastatin.  Insurance is covering.   3. Cough variant asthma with positive bronchoprovocation study in 2013 - off inhalers and no use of PRN rescue.  Pt feels like it is fine, wife does not agreee.  Thinks certain foods will flare it.  Activity will not cause it. Is off all inhalers at this time.   4. Rosacea - over summer \"it got under control\". Had one flare up when out on a nadeen and hot day with no sunscreen. Controlled in last 2 weeks and now off Soolantra. Off Oracea at this time.  Using daily sunscreen even in winter.   5. Hx of Depressed Mood - stress is up, but \"mood is OK\".  Has some home stress with in-laws. Having to travel some for care of in-laws.    6. GERD - resolved years ago and with weight loss. However, still having some occasional sx despite off atorvastatin in past.  Not taking any meds right now. Often will get sx that even cause some tightness in L shoulder when drinks coffee and and eats too much.  Did trial OTC heartburn med when had this sx in past and \"seemed to go away fairly quickly\".    7. Vitamin D deficiency - still on 1000 I.U. daily Vitamin D3 daily.     Review of Systems   Constitution: Positive for weight gain. Negative for chills, fever, malaise/fatigue and weight loss.   HENT: Negative for congestion, hearing loss, odynophagia and sore throat.    Eyes: Negative for discharge, double " vision, pain and redness.        Last eye exam several years ago; no glasses     Cardiovascular: Negative for chest pain, dyspnea on exertion, irregular heartbeat, leg swelling, near-syncope, palpitations and syncope.        Pressure in L shoulder area with some heatburn issues     Respiratory: Negative for cough and shortness of breath.    Endocrine: Negative for polydipsia, polyphagia and polyuria.   Hematologic/Lymphatic: Negative for bleeding problem. Does not bruise/bleed easily.   Skin: Negative for rash and suspicious lesions.   Musculoskeletal: Negative for back pain, joint pain, joint swelling, muscle cramps, muscle weakness and myalgias.   Gastrointestinal: Positive for heartburn (variable). Negative for bloating, abdominal pain, constipation, diarrhea, dysphagia, nausea and vomiting.   Genitourinary: Negative for dysuria, frequency, hematuria and hesitancy.   Neurological: Negative for dizziness, headaches and light-headedness.   Psychiatric/Behavioral: Negative for depression. The patient does not have insomnia (wakes at times for work stress) and is not nervous/anxious.    Allergic/Immunologic: Negative for environmental allergies and persistent infections.       Problem List:    Patient Active Problem List   Diagnosis   • Gastroesophageal reflux disease   • Cough variant asthma   • Hyperlipidemia   • Gilbert's syndrome   • Rosacea   • Vitamin D deficiency   • Environmental allergies   • DDD (degenerative disc disease), lumbar       Medical History:    Past Medical History:   Diagnosis Date   • Cough variant asthma     normal PFT's at baseline with positive bronchoprovocation study in 2013   • DDD (degenerative disc disease), lumbar     s/p epidural in 2013; more distant hx of disc herniation to L S1 nerve root in 2008 largely resorbed on 2013 MRI.    • Displacement of lumbar disc with radiculopathy 7/17/2017    S/p L5-S1 discectomy   • GERD (gastroesophageal reflux disease)    • Gilbert's syndrome      "suspected with variable indirect elevations of bilirubin on labs   • History of kidney stones     x 3; last in ~2017   • Hyperlipidemia    • Low back pain    • Lumbar radiculopathy     s/p epidural in 2013   • Nephrolithiasis     s/p stent and retreival 3/2013   • Rosacea    • Snoring 10/7/2016    Resolved with weight loss in 2017; hx of uvulectomy and septoplasty in 2008   • Vitamin D deficiency         Social History:    Social History     Socioeconomic History   • Marital status:      Spouse name: Not on file   • Number of children: 2   • Years of education: [employer]STERS   • Highest education level: Not on file   Occupational History   • 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   Tobacco Use   • Smoking status: Former Smoker   • Smokeless tobacco: Never Used   • Tobacco comment: 1 pk/yr in past quit   Substance and Sexual Activity   • Alcohol use: Yes     Comment: quit 05/2017; no hx of abuse; one drink/ month in the summer in 2019   • Drug use: No   • Sexual activity: Yes     Partners: Female     Comment: wife only; no hx STD's   Lifestyle   • Physical activity:     Days per week: 0 days     Minutes per session: 0 min   • Stress: Not on file   Social History Narrative    Diet - \"normal\"; eats fruits and veggies; eating better; 2019 more keto style diet    Exercise - PT maneuvers; walking 5 miles/ daily; none in 2019    Caffeine - 2-3 cups coffee daily       Family History:   Family History   Problem Relation Age of Onset   • Hyperlipidemia Mother    • Rosacea Mother    • Heart disease Father    • Prostate cancer Father    • Hyperlipidemia Father    • Nephrolithiasis Father    • Hyperlipidemia Brother    • Diabetes Brother    • No Known Problems Daughter    • Parkinsonism Paternal Grandmother    • Malig Hyperthermia Neg Hx        Surgical History:   Past Surgical History:   Procedure Laterality Date   • CYSTOSCOPY      with insertion of ureteral stent 2013   • EPIDURAL BLOCK     • EYE SURGERY      lasik " bilateral 2013   • LUMBAR DISCECTOMY FUSION INSTRUMENTATION Left 8/7/2017    Procedure: Left L5-S1 laminectomy/discectomy with [employer];  Surgeon: Corey García MD;  Location: [facility];  Service:    • SEPTOPLASTY      2008 with T&A   • TONSILLECTOMY AND ADENOIDECTOMY      2008   • VASECTOMY      2010         Current Outpatient Medications:   •  Cholecalciferol (VITAMIN D-3) 1000 UNITS capsule, Take 1,000 Units by mouth Daily., Disp: , Rfl:   •  pitavastatin calcium (LIVALO) 2 MG tablet tablet, Take 1 tablet by mouth Every Night., Disp: 90 tablet, Rfl: 3  •  albuterol (PROVENTIL HFA;VENTOLIN HFA) 108 (90 BASE) MCG/ACT inhaler, Inhale 2 puffs Every 4 (Four) Hours As Needed for Wheezing or Shortness of Air., Disp: , Rfl:   •  famotidine (PEPCID) 20 MG tablet, Take 1 tablet by mouth 2 (Two) Times a Day As Needed for Heartburn., Disp: , Rfl:   •  SOOLANTRA 1 % cream, , Disp: , Rfl:     Vitals:    11/08/19 1010   BP: 118/70   Pulse: 69   Resp: 12   Temp: 97.7 °F (36.5 °C)   SpO2: 98%       Physical Exam   Constitutional: He is oriented to person, place, and time. He appears well-developed and well-nourished. He is cooperative. No distress.   HENT:   Head: Normocephalic and atraumatic.   Right Ear: Hearing, tympanic membrane, external ear and ear canal normal.   Left Ear: Hearing, tympanic membrane, external ear and ear canal normal.   Nose: Nose normal.   Mouth/Throat: Uvula is midline, oropharynx is clear and moist and mucous membranes are normal. No oropharyngeal exudate.   Eyes: Conjunctivae, EOM and lids are normal. Pupils are equal, round, and reactive to light. Right eye exhibits no discharge. Left eye exhibits no discharge. No scleral icterus.   Neck: Normal range of motion and full passive range of motion without pain. Neck supple. Carotid bruit is not present. No thyroid mass and no thyromegaly present.   Cardiovascular: Normal rate, regular rhythm, S1 normal, S2 normal and normal heart sounds. Exam  reveals no gallop and no friction rub.   No murmur heard.  Pulses:       Radial pulses are 2+ on the right side, and 2+ on the left side.        Dorsalis pedis pulses are 2+ on the right side, and 2+ on the left side.        Posterior tibial pulses are 2+ on the right side, and 2+ on the left side.   Pulmonary/Chest: Effort normal and breath sounds normal. No respiratory distress. He has no wheezes. He has no rhonchi. He has no rales.   Abdominal: Soft. Bowel sounds are normal. He exhibits no distension and no mass. There is no hepatosplenomegaly. There is no tenderness. There is no rebound and no guarding.   Genitourinary: Rectum normal and prostate normal. Prostate is not enlarged and not tender.   Musculoskeletal: Normal range of motion. He exhibits no edema.     Vascular Status -  His right foot exhibits normal foot vasculature  and no edema. His left foot exhibits normal foot vasculature  and no edema.  Skin Integrity  -  His right foot skin is intact.His left foot skin is intact..  Lymphadenopathy:     He has no cervical adenopathy.     He has no axillary adenopathy.        Right: No inguinal adenopathy present.        Left: No inguinal adenopathy present.   Neurological: He is alert and oriented to person, place, and time. He has normal strength and normal reflexes. He displays no tremor. No cranial nerve deficit or sensory deficit. He exhibits normal muscle tone. Gait normal.   Skin: Skin is warm, dry and intact. No rash noted.        Psychiatric: He has a normal mood and affect. His speech is normal and behavior is normal. Thought content normal. Cognition and memory are normal.   Vitals reviewed.      ECG 12 Lead  Date/Time: 11/8/2019 11:17 AM  Performed by: Jesse Witt MD  Authorized by: Jesse Witt MD   Comparison: compared with previous ECG from 7/15/2011  Similar to previous ECG  Rhythm: sinus rhythm  Rate: normal  BPM: 69  ST Segments: ST segments normal  ST segment elevation noted on lead: J  point elevation diffusely.  T Waves: T waves normal  QRS axis: normal    Clinical impression: non-specific ECG  Comments: QTc - 422  Indication - chest pressure at rest, hx of reflux              Assessment/ Plan  Diagnoses and all orders for this visit:    Healthcare maintenance    Mixed hyperlipidemia    Cough variant asthma    Gastroesophageal reflux disease, esophagitis presence not specified  -     famotidine (PEPCID) 20 MG tablet; Take 1 tablet by mouth 2 (Two) Times a Day As Needed for Heartburn.    Vitamin D deficiency    Gilbert's syndrome    Environmental allergies    Rosacea    DDD (degenerative disc disease), lumbar    Chest pressure  Comments:  intermittent, at rest    Other orders  -     ECG 12 Lead        Return in about 1 year (around 11/8/2020) for Annual physical.      Discussion:  Enzo Sewell is a 44 y.o. male RTC in yearly CPE, review of medical issues:    1. DDD at L4-L5 and hx of disc herniation now s/p Left L5-S1 laminectomy/discectomy with Metrx 8/7/17 with Dr. Ricky BOYCE.  No f/u planned for Nsgy unless sx arise.   2. HLD with moderate CV risk and elevated hsCRP - good tolerance of moderate dose Livalo. No return of vague return of reflux/ dyspepsia sx that had when on Atorvastatin. LFT's are OK   3. Cough variant asthma with positive bronchoprovocation study in 2013 - off inhalers and no use of PRN rescue. Monitor.   4. Rosacea - Off Soolantra and Oracea with good control. C/W daily sunscreen.  5. Hx of Depressed Mood - mood is good despite stress at work and at home.   6. GERD - largely resolved years ago however had some intermittent sx of L chest pressure at rest, resolves with PRN anti-H meds.  EKG stable and unchanged in office today, pt worried well. Reassured pt.  C/W PRN anti-H meds for heartburn. Pt to monitor for any signs of exertional chest pressure, SOA, unrelieved sx with GI meds.    7. Vitamin D deficiency - replete on 1000 I.U. daily Vitamin D3 daily. C/W same.   8.  Hx of Snoring - resolved s/p uvulectomy and septoplasty in 2008 and with weight loss. [place]EN.  9. HM - labs d/w pt; Flu/ Tdap/ Hep B/ Hep A - UTD; JOE OK today (FHx prostate CA), d/w pt PSA at age 50; C-scope at 45; add back exercise as able    RTC one year CPE, F labs prior

## 2020-10-28 DIAGNOSIS — E55.9 VITAMIN D DEFICIENCY: ICD-10-CM

## 2020-10-28 DIAGNOSIS — E78.2 MIXED HYPERLIPIDEMIA: ICD-10-CM

## 2020-10-28 DIAGNOSIS — Z00.00 HEALTHCARE MAINTENANCE: Primary | ICD-10-CM

## 2020-10-28 DIAGNOSIS — E80.4 GILBERT'S SYNDROME: ICD-10-CM

## 2020-10-28 DIAGNOSIS — Z11.59 NEED FOR HEPATITIS C SCREENING TEST: ICD-10-CM

## 2020-11-05 LAB
25(OH)D3+25(OH)D2 SERPL-MCNC: 27.9 NG/ML (ref 30–100)
ALBUMIN SERPL-MCNC: 5.2 G/DL (ref 3.5–5.2)
ALBUMIN/GLOB SERPL: 3.3 G/DL
ALP SERPL-CCNC: 68 U/L (ref 39–117)
ALT SERPL-CCNC: 17 U/L (ref 1–41)
APPEARANCE UR: CLEAR
AST SERPL-CCNC: 15 U/L (ref 1–40)
BACTERIA #/AREA URNS HPF: NORMAL /HPF
BASOPHILS # BLD AUTO: 0.08 10*3/MM3 (ref 0–0.2)
BASOPHILS NFR BLD AUTO: 1.3 % (ref 0–1.5)
BILIRUB SERPL-MCNC: 1.6 MG/DL (ref 0–1.2)
BILIRUB UR QL STRIP: NEGATIVE
BUN SERPL-MCNC: 16 MG/DL (ref 6–20)
BUN/CREAT SERPL: 13.2 (ref 7–25)
CALCIUM SERPL-MCNC: 10 MG/DL (ref 8.6–10.5)
CHLORIDE SERPL-SCNC: 104 MMOL/L (ref 98–107)
CHOLEST SERPL-MCNC: 289 MG/DL (ref 0–200)
CO2 SERPL-SCNC: 27 MMOL/L (ref 22–29)
COLOR UR: YELLOW
CREAT SERPL-MCNC: 1.21 MG/DL (ref 0.76–1.27)
EOSINOPHIL # BLD AUTO: 0.34 10*3/MM3 (ref 0–0.4)
EOSINOPHIL NFR BLD AUTO: 5.5 % (ref 0.3–6.2)
EPI CELLS #/AREA URNS HPF: NORMAL /HPF (ref 0–10)
ERYTHROCYTE [DISTWIDTH] IN BLOOD BY AUTOMATED COUNT: 13.1 % (ref 12.3–15.4)
GLOBULIN SER CALC-MCNC: 1.6 GM/DL
GLUCOSE SERPL-MCNC: 98 MG/DL (ref 65–99)
GLUCOSE UR QL: NEGATIVE
HCT VFR BLD AUTO: 47.2 % (ref 37.5–51)
HCV AB S/CO SERPL IA: <0.1 S/CO RATIO (ref 0–0.9)
HDLC SERPL-MCNC: 38 MG/DL (ref 40–60)
HGB BLD-MCNC: 16.3 G/DL (ref 13–17.7)
HGB UR QL STRIP: NEGATIVE
IMM GRANULOCYTES # BLD AUTO: 0.03 10*3/MM3 (ref 0–0.05)
IMM GRANULOCYTES NFR BLD AUTO: 0.5 % (ref 0–0.5)
KETONES UR QL STRIP: NEGATIVE
LDLC SERPL CALC-MCNC: 208 MG/DL (ref 0–100)
LEUKOCYTE ESTERASE UR QL STRIP: NEGATIVE
LYMPHOCYTES # BLD AUTO: 2.17 10*3/MM3 (ref 0.7–3.1)
LYMPHOCYTES NFR BLD AUTO: 35 % (ref 19.6–45.3)
MCH RBC QN AUTO: 29.4 PG (ref 26.6–33)
MCHC RBC AUTO-ENTMCNC: 34.5 G/DL (ref 31.5–35.7)
MCV RBC AUTO: 85.2 FL (ref 79–97)
MICRO URNS: NORMAL
MICRO URNS: NORMAL
MONOCYTES # BLD AUTO: 0.44 10*3/MM3 (ref 0.1–0.9)
MONOCYTES NFR BLD AUTO: 7.1 % (ref 5–12)
MUCOUS THREADS URNS QL MICRO: PRESENT /HPF
NEUTROPHILS # BLD AUTO: 3.14 10*3/MM3 (ref 1.7–7)
NEUTROPHILS NFR BLD AUTO: 50.6 % (ref 42.7–76)
NITRITE UR QL STRIP: NEGATIVE
NRBC BLD AUTO-RTO: 0 /100 WBC (ref 0–0.2)
PH UR STRIP: 6 [PH] (ref 5–7.5)
PLATELET # BLD AUTO: 225 10*3/MM3 (ref 140–450)
POTASSIUM SERPL-SCNC: 5.1 MMOL/L (ref 3.5–5.2)
PROT SERPL-MCNC: 6.8 G/DL (ref 6–8.5)
PROT UR QL STRIP: NORMAL
RBC # BLD AUTO: 5.54 10*6/MM3 (ref 4.14–5.8)
RBC #/AREA URNS HPF: NORMAL /HPF (ref 0–2)
SODIUM SERPL-SCNC: 140 MMOL/L (ref 136–145)
SP GR UR: 1.02 (ref 1–1.03)
TRIGL SERPL-MCNC: 217 MG/DL (ref 0–150)
TSH SERPL DL<=0.005 MIU/L-ACNC: 0.7 UIU/ML (ref 0.27–4.2)
URINALYSIS REFLEX: NORMAL
UROBILINOGEN UR STRIP-MCNC: 0.2 MG/DL (ref 0.2–1)
VLDLC SERPL CALC-MCNC: 43 MG/DL (ref 5–40)
WBC # BLD AUTO: 6.2 10*3/MM3 (ref 3.4–10.8)
WBC #/AREA URNS HPF: NORMAL /HPF (ref 0–5)

## 2021-03-19 ENCOUNTER — IMMUNIZATION (OUTPATIENT)
Dept: VACCINE CLINIC | Facility: HOSPITAL | Age: 46
End: 2021-03-19

## 2021-03-19 PROCEDURE — 0001A: CPT | Performed by: OBSTETRICS & GYNECOLOGY

## 2021-03-19 PROCEDURE — 91300 HC SARSCOV02 VAC 30MCG/0.3ML IM: CPT | Performed by: OBSTETRICS & GYNECOLOGY

## 2021-04-12 ENCOUNTER — IMMUNIZATION (OUTPATIENT)
Dept: VACCINE CLINIC | Facility: HOSPITAL | Age: 46
End: 2021-04-12

## 2021-04-12 PROCEDURE — 91300 HC SARSCOV02 VAC 30MCG/0.3ML IM: CPT | Performed by: OBSTETRICS & GYNECOLOGY

## 2021-04-12 PROCEDURE — 0002A: CPT | Performed by: OBSTETRICS & GYNECOLOGY

## 2021-05-19 DIAGNOSIS — E78.2 MIXED HYPERLIPIDEMIA: Primary | ICD-10-CM

## 2021-05-19 DIAGNOSIS — Z00.00 HEALTHCARE MAINTENANCE: ICD-10-CM

## 2021-05-20 LAB
ALBUMIN SERPL-MCNC: 5.1 G/DL (ref 3.5–5.2)
ALBUMIN/GLOB SERPL: 2.8 G/DL
ALP SERPL-CCNC: 59 U/L (ref 39–117)
ALT SERPL-CCNC: 21 U/L (ref 1–41)
APPEARANCE UR: CLEAR
AST SERPL-CCNC: 15 U/L (ref 1–40)
BACTERIA #/AREA URNS HPF: NORMAL /HPF
BASOPHILS # BLD AUTO: 0.07 10*3/MM3 (ref 0–0.2)
BASOPHILS NFR BLD AUTO: 1.1 % (ref 0–1.5)
BILIRUB SERPL-MCNC: 1.2 MG/DL (ref 0–1.2)
BILIRUB UR QL STRIP: NEGATIVE
BUN SERPL-MCNC: 20 MG/DL (ref 6–20)
BUN/CREAT SERPL: 17.4 (ref 7–25)
CALCIUM SERPL-MCNC: 10.2 MG/DL (ref 8.6–10.5)
CASTS URNS MICRO: NORMAL
CHLORIDE SERPL-SCNC: 103 MMOL/L (ref 98–107)
CHOLEST SERPL-MCNC: 337 MG/DL (ref 0–200)
CO2 SERPL-SCNC: 26.8 MMOL/L (ref 22–29)
COLOR UR: YELLOW
CREAT SERPL-MCNC: 1.15 MG/DL (ref 0.76–1.27)
EOSINOPHIL # BLD AUTO: 0.24 10*3/MM3 (ref 0–0.4)
EOSINOPHIL NFR BLD AUTO: 3.7 % (ref 0.3–6.2)
EPI CELLS #/AREA URNS HPF: NORMAL /HPF
ERYTHROCYTE [DISTWIDTH] IN BLOOD BY AUTOMATED COUNT: 12.8 % (ref 12.3–15.4)
GLOBULIN SER CALC-MCNC: 1.8 GM/DL
GLUCOSE SERPL-MCNC: 92 MG/DL (ref 65–99)
GLUCOSE UR QL: NEGATIVE
HCT VFR BLD AUTO: 49 % (ref 37.5–51)
HDLC SERPL-MCNC: 45 MG/DL (ref 40–60)
HGB BLD-MCNC: 16.4 G/DL (ref 13–17.7)
HGB UR QL STRIP: NEGATIVE
IMM GRANULOCYTES # BLD AUTO: 0.02 10*3/MM3 (ref 0–0.05)
IMM GRANULOCYTES NFR BLD AUTO: 0.3 % (ref 0–0.5)
KETONES UR QL STRIP: NEGATIVE
LDLC SERPL CALC-MCNC: 247 MG/DL (ref 0–100)
LEUKOCYTE ESTERASE UR QL STRIP: NEGATIVE
LYMPHOCYTES # BLD AUTO: 2.13 10*3/MM3 (ref 0.7–3.1)
LYMPHOCYTES NFR BLD AUTO: 32.9 % (ref 19.6–45.3)
MCH RBC QN AUTO: 29.4 PG (ref 26.6–33)
MCHC RBC AUTO-ENTMCNC: 33.5 G/DL (ref 31.5–35.7)
MCV RBC AUTO: 88 FL (ref 79–97)
MONOCYTES # BLD AUTO: 0.48 10*3/MM3 (ref 0.1–0.9)
MONOCYTES NFR BLD AUTO: 7.4 % (ref 5–12)
NEUTROPHILS # BLD AUTO: 3.53 10*3/MM3 (ref 1.7–7)
NEUTROPHILS NFR BLD AUTO: 54.6 % (ref 42.7–76)
NITRITE UR QL STRIP: NEGATIVE
NRBC BLD AUTO-RTO: 0 /100 WBC (ref 0–0.2)
PH UR STRIP: 5.5 [PH] (ref 5–8)
PLATELET # BLD AUTO: 262 10*3/MM3 (ref 140–450)
POTASSIUM SERPL-SCNC: 4.7 MMOL/L (ref 3.5–5.2)
PROT SERPL-MCNC: 6.9 G/DL (ref 6–8.5)
PROT UR QL STRIP: NEGATIVE
RBC # BLD AUTO: 5.57 10*6/MM3 (ref 4.14–5.8)
RBC #/AREA URNS HPF: NORMAL /HPF
SODIUM SERPL-SCNC: 139 MMOL/L (ref 136–145)
SP GR UR: 1.02 (ref 1–1.03)
TRIGL SERPL-MCNC: 221 MG/DL (ref 0–150)
TSH SERPL DL<=0.005 MIU/L-ACNC: 1.21 UIU/ML (ref 0.27–4.2)
UROBILINOGEN UR STRIP-MCNC: NORMAL MG/DL
VLDLC SERPL CALC-MCNC: 45 MG/DL (ref 5–40)
WBC # BLD AUTO: 6.47 10*3/MM3 (ref 3.4–10.8)
WBC #/AREA URNS HPF: NORMAL /HPF

## 2021-05-25 ENCOUNTER — OFFICE VISIT (OUTPATIENT)
Dept: INTERNAL MEDICINE | Facility: CLINIC | Age: 46
End: 2021-05-25

## 2021-05-25 ENCOUNTER — HOSPITAL ENCOUNTER (OUTPATIENT)
Dept: GENERAL RADIOLOGY | Facility: HOSPITAL | Age: 46
Discharge: HOME | End: 2021-05-25
Admitting: INTERNAL MEDICINE

## 2021-05-25 VITALS
TEMPERATURE: 97.7 F | DIASTOLIC BLOOD PRESSURE: 60 MMHG | BODY MASS INDEX: 24.45 KG/M2 | SYSTOLIC BLOOD PRESSURE: 118 MMHG | RESPIRATION RATE: 20 BRPM | OXYGEN SATURATION: 98 % | WEIGHT: 184.5 LBS | HEIGHT: 73 IN | HEART RATE: 74 BPM

## 2021-05-25 DIAGNOSIS — R05.3 CHRONIC COUGH: ICD-10-CM

## 2021-05-25 DIAGNOSIS — L71.9 ROSACEA: ICD-10-CM

## 2021-05-25 DIAGNOSIS — R13.14 PHARYNGOESOPHAGEAL DYSPHAGIA: ICD-10-CM

## 2021-05-25 DIAGNOSIS — K21.9 GASTROESOPHAGEAL REFLUX DISEASE, UNSPECIFIED WHETHER ESOPHAGITIS PRESENT: ICD-10-CM

## 2021-05-25 DIAGNOSIS — Z91.09 ENVIRONMENTAL ALLERGIES: ICD-10-CM

## 2021-05-25 DIAGNOSIS — Z00.00 HEALTHCARE MAINTENANCE: Primary | ICD-10-CM

## 2021-05-25 DIAGNOSIS — E55.9 VITAMIN D DEFICIENCY: ICD-10-CM

## 2021-05-25 DIAGNOSIS — N20.0 NEPHROLITHIASIS: ICD-10-CM

## 2021-05-25 DIAGNOSIS — J45.991 COUGH VARIANT ASTHMA: ICD-10-CM

## 2021-05-25 DIAGNOSIS — E78.2 MIXED HYPERLIPIDEMIA: ICD-10-CM

## 2021-05-25 DIAGNOSIS — B35.4 TINEA CORPORIS: ICD-10-CM

## 2021-05-25 DIAGNOSIS — R93.89 ABNORMAL CHEST X-RAY: Primary | ICD-10-CM

## 2021-05-25 DIAGNOSIS — M51.36 DDD (DEGENERATIVE DISC DISEASE), LUMBAR: ICD-10-CM

## 2021-05-25 PROCEDURE — 99213 OFFICE O/P EST LOW 20 MIN: CPT | Performed by: INTERNAL MEDICINE

## 2021-05-25 PROCEDURE — 71046 X-RAY EXAM CHEST 2 VIEWS: CPT

## 2021-05-25 PROCEDURE — 99396 PREV VISIT EST AGE 40-64: CPT | Performed by: INTERNAL MEDICINE

## 2021-05-25 RX ORDER — PRENATAL VIT 91/IRON/FOLIC/DHA 28-975-200
COMBINATION PACKAGE (EA) ORAL NIGHTLY
Start: 2021-05-25 | End: 2021-09-23

## 2021-05-25 RX ORDER — FLUTICASONE PROPIONATE 50 MCG
2 SPRAY, SUSPENSION (ML) NASAL DAILY
COMMUNITY
End: 2022-08-09 | Stop reason: SDUPTHER

## 2021-05-25 RX ORDER — MONTELUKAST SODIUM 10 MG/1
TABLET ORAL
COMMUNITY
Start: 2021-05-20 | End: 2021-09-23

## 2021-05-25 NOTE — PROGRESS NOTES
"Annual Exam (yearly wellness review labs discuss colonoscopy ) and Hyperlipidemia      HPI  Enzo Sewell is a 45 y.o. male RTC in yearly CPE, review of medical issues:   Has been doing OK. \"Ok.  Nothing too exciting\".  Dealing with some grief after loss of in-laws who dealt with dementia.    1. DDD at L4-L5 and hx of disc herniation now s/p Left L5-S1 laminectomy/discectomy with Metrx 8/7/17 with Dr. Ricky BOYCE.  Had some lifting this Spring in yard and had some pain. ON Aleve PRN now. Feels like is getting better.  Taking only on pill daily at night right now.   2. HLD with moderate CV risk and elevated hsCRP - had good tolerance of moderate dose Livalo. Stopped med after missed some appts, but notes it was not refused. 'I did not call in to refill it\". \"That is on me\".  Saw labs already.  Diet is good, 'I dont think I eat anything bad'.  On Atorvastatin in past. Never took other statins.   3. Cough variant asthma with positive bronchoprovocation study in 2013 - off inhalers and no use of PRN rescue last year. However since November cough 'spiraled out of hand'.  Did see allergist and got prick test and all was negative.  Felt like inhalers was not that helpful in past. Did d/w allergist the past breathing study. Started on 4 meds a few days ago.  No change yet.   4. Rosacea - 'Fine, all good\".   No meds.  Using more sunscreen. Diet helps as well. Had a few comedones this past weekend after been out in sun padMicropelting.   Off Soolantra and Oracea at this time.  5. Hx of Depressed Mood - mood is good despite stress at work, COVID, loss of in laws.  \"A mental challenge\".  Been back at work since 6/2021. Denies depression today. 6. GERD - largely resolved years ago however had some intermittent sx of L chest pressure at rest in past.  Really no other recent sx other than cough. Admits some issues with swallowing and 'always needs water around to wash down\".  No impaction hx.  No prior EGD. Allergist told him " "some of cough may be reflux related.   7. HM -  C-scope at 45, not had done yet.     Review of Systems   Constitutional: Positive for weight gain (a few pounds after had gotten down to 172 over last year.). Negative for chills, fever, malaise/fatigue and weight loss.   HENT: Negative for congestion (some nasal drainage, not atypical for pt in Spring. ), hearing loss, hoarse voice, odynophagia and sore throat.    Eyes: Negative for discharge, double vision, pain and redness.        Last eye exam ~2016; no glasses after Lasik   Cardiovascular: Negative for chest pain, dyspnea on exertion, irregular heartbeat, near-syncope, palpitations and syncope.   Respiratory: Positive for cough and shortness of breath (\"occasionally\", mild only). Negative for sputum production and wheezing.    Endocrine: Negative for polydipsia, polyphagia and polyuria.   Hematologic/Lymphatic: Negative for bleeding problem. Does not bruise/bleed easily.   Skin: Negative for rash (rignworm on R posterior calf. Resolved with topical OTC med for 4-5 days only, reoccured. back on med 4-5 days.  ) and suspicious lesions.   Musculoskeletal: Negative for joint pain, joint swelling, muscle cramps, muscle weakness and myalgias.   Gastrointestinal: Positive for dysphagia (can feel some sticking of food with 'breaded sandwich'.  Never had EGD. ). Negative for constipation, diarrhea, heartburn, nausea and vomiting.   Genitourinary: Negative for dysuria, frequency, hematuria, hesitancy and incomplete emptying.        Passed kidney stone at home the other day.  Did not catch it.      Neurological: Negative for dizziness, headaches and light-headedness.   Psychiatric/Behavioral: Negative for depression. The patient does not have insomnia and is not nervous/anxious.    Allergic/Immunologic: Positive for environmental allergies. Negative for persistent infections.       Problem List:    Patient Active Problem List   Diagnosis   • Gastroesophageal reflux disease "   • Cough variant asthma   • Hyperlipidemia   • Gilbert's syndrome   • Rosacea   • Vitamin D deficiency   • Environmental allergies   • DDD (degenerative disc disease), lumbar   • Nephrolithiasis       Medical History:    Past Medical History:   Diagnosis Date   • Cough variant asthma     normal PFT's at baseline with positive bronchoprovocation study in    • DDD (degenerative disc disease), lumbar     s/p epidural in ; more distant hx of disc herniation to L S1 nerve root in  largely resorbed on  MRI.    • Displacement of lumbar disc with radiculopathy 2017    S/p L5-S1 discectomy   • GERD (gastroesophageal reflux disease)    • Gilbert's syndrome     suspected with variable indirect elevations of bilirubin on labs   • History of kidney stones     x 3; last in ~2017   • Hyperlipidemia    • Low back pain    • Lumbar radiculopathy     s/p epidural in    • Nephrolithiasis     s/p stent and retreival 3/2013   • Rosacea    • Snoring 10/7/2016    Resolved with weight loss in 2017; hx of uvulectomy and septoplasty in    • Vitamin D deficiency         Social History:    Social History     Socioeconomic History   • Marital status:      Spouse name: Not on file   • Number of children: 2   • Years of education: MASTERS   • Highest education level: Not on file   Tobacco Use   • Smoking status: Former Smoker     Packs/day: 0.25     Years: 2.00     Pack years: 0.50     Types: Cigarettes     Quit date: 1991     Years since quittin.0   • Smokeless tobacco: Never Used   • Tobacco comment: 1 pk/yr in past quit   Vaping Use   • Vaping Use: Never used   Substance and Sexual Activity   • Alcohol use: Yes     Comment: quit 2017; no hx of abuse; 1-3 drinks/ month in the summer   • Drug use: No   • Sexual activity: Yes     Partners: Female     Comment: wife only; no hx STD's       Family History:   Family History   Problem Relation Age of Onset   • Hyperlipidemia Mother    • Rosacea Mother     • Heart disease Father    • Prostate cancer Father    • Hyperlipidemia Father    • Nephrolithiasis Father    • Hyperlipidemia Brother    • Diabetes Brother    • No Known Problems Daughter    • Parkinsonism Paternal Grandmother    • Malig Hyperthermia Neg Hx        Surgical History:   Past Surgical History:   Procedure Laterality Date   • CYSTOSCOPY      with insertion of ureteral stent 2013   • EPIDURAL BLOCK     • EYE SURGERY      lasik bilateral 2013   • LUMBAR DISCECTOMY FUSION INSTRUMENTATION Left 8/7/2017    Procedure: Left L5-S1 laminectomy/discectomy with Metrix;  Surgeon: Corey García MD;  Location: [facility] OR;  Service:    • SEPTOPLASTY      2008 with T&A   • TONSILLECTOMY AND ADENOIDECTOMY      2008   • VASECTOMY      2010         Current Outpatient Medications:   •  albuterol (PROVENTIL HFA;VENTOLIN HFA) 108 (90 BASE) MCG/ACT inhaler, Inhale 2 puffs Every 4 (Four) Hours As Needed for Wheezing or Shortness of Air., Disp: , Rfl:   •  Cholecalciferol (VITAMIN D-3) 1000 UNITS capsule, Take 1,000 Units by mouth Daily., Disp: , Rfl:   •  esomeprazole (nexIUM) 20 MG capsule, Take 20 mg by mouth Every Morning Before Breakfast., Disp: , Rfl:   •  fluticasone (FLONASE) 50 MCG/ACT nasal spray, 2 sprays into the nostril(s) as directed by provider Daily., Disp: , Rfl:   •  montelukast (SINGULAIR) 10 MG tablet, , Disp: , Rfl:   •  NON FORMULARY, Chlortrimeton 4mg 1 tab po prn, Disp: , Rfl:   •  pitavastatin calcium (Livalo) 2 MG tablet tablet, Take 1 tablet by mouth Every Night., Disp: 90 tablet, Rfl: 3  •  terbinafine (LamISIL AT) 1 % cream, Apply  topically to the appropriate area as directed Every Night. X 4 weeks, Disp: , Rfl:     Vitals:    05/25/21 1131   BP: 118/60   Pulse: 74   Resp: 20   Temp: 97.7 °F (36.5 °C)   SpO2: 98%     Body mass index is 24.34 kg/m².    Physical Exam  Vitals reviewed.   Constitutional:       General: He is not in acute distress.     Appearance: Normal appearance. He is  well-developed. He is not ill-appearing or toxic-appearing.   HENT:      Head: Normocephalic and atraumatic.      Right Ear: Hearing, tympanic membrane, ear canal and external ear normal.      Left Ear: Hearing, tympanic membrane, ear canal and external ear normal.      Nose: Nose normal.      Mouth/Throat:      Mouth: Mucous membranes are moist. No oral lesions.      Tongue: No lesions.      Pharynx: Oropharynx is clear. Uvula midline. No pharyngeal swelling, oropharyngeal exudate, posterior oropharyngeal erythema or uvula swelling.   Eyes:      General: Lids are normal. No scleral icterus.        Right eye: No discharge.         Left eye: No discharge.      Extraocular Movements: Extraocular movements intact.      Conjunctiva/sclera: Conjunctivae normal.      Pupils: Pupils are equal, round, and reactive to light.   Neck:      Thyroid: No thyroid mass or thyromegaly.      Vascular: No carotid bruit.   Cardiovascular:      Rate and Rhythm: Normal rate and regular rhythm.      Pulses:           Radial pulses are 2+ on the right side and 2+ on the left side.        Dorsalis pedis pulses are 2+ on the right side and 2+ on the left side.        Posterior tibial pulses are 2+ on the right side and 2+ on the left side.      Heart sounds: Normal heart sounds, S1 normal and S2 normal. No murmur heard.   No friction rub. No gallop.    Pulmonary:      Effort: Pulmonary effort is normal. No respiratory distress.      Breath sounds: Normal breath sounds. No wheezing, rhonchi or rales.   Abdominal:      General: Bowel sounds are normal. There is no distension.      Palpations: Abdomen is soft. There is no mass.      Tenderness: There is no abdominal tenderness. There is no guarding or rebound.   Genitourinary:     Prostate: Normal. Not enlarged and not tender.      Rectum: Normal. No external hemorrhoid. Normal anal tone.   Musculoskeletal:         General: No deformity. Normal range of motion.      Cervical back: Full passive  range of motion without pain, normal range of motion and neck supple.      Right lower leg: No edema.      Left lower leg: No edema.   Lymphadenopathy:      Cervical: No cervical adenopathy.      Right cervical: No superficial, deep or posterior cervical adenopathy.     Left cervical: No superficial, deep or posterior cervical adenopathy.      Upper Body:      Right upper body: No supraclavicular, axillary or pectoral adenopathy.      Left upper body: No supraclavicular, axillary or pectoral adenopathy.   Skin:     General: Skin is warm and dry.      Findings: Acne (rare closed comedones on frontal/ maxillary region c/w past rosacea. ) present. No rash.          Neurological:      Mental Status: He is alert and oriented to person, place, and time.      Cranial Nerves: No cranial nerve deficit.      Sensory: No sensory deficit.      Motor: No weakness, tremor, atrophy or abnormal muscle tone.      Gait: Gait normal.      Deep Tendon Reflexes: Reflexes are normal and symmetric.      Reflex Scores:       Patellar reflexes are 2+ on the right side and 2+ on the left side.       Achilles reflexes are 2+ on the right side and 2+ on the left side.  Psychiatric:         Attention and Perception: Attention normal.         Mood and Affect: Mood normal.         Speech: Speech normal.         Behavior: Behavior normal. Behavior is cooperative.         Thought Content: Thought content normal.         Assessment/ Plan  Diagnoses and all orders for this visit:    Healthcare maintenance  -     Ambulatory Referral to Gastroenterology    Nephrolithiasis    Vitamin D deficiency    Rosacea    Mixed hyperlipidemia  -     pitavastatin calcium (Livalo) 2 MG tablet tablet; Take 1 tablet by mouth Every Night.    Gastroesophageal reflux disease, unspecified whether esophagitis present  -     Ambulatory Referral to Gastroenterology    Environmental allergies    DDD (degenerative disc disease), lumbar    Cough variant asthma  -     XR Chest PA  & Lateral; Future    Pharyngoesophageal dysphagia  -     Ambulatory Referral to Gastroenterology    Chronic cough  -     XR Chest PA & Lateral; Future  -     Ambulatory Referral to Gastroenterology    Tinea corporis  -     terbinafine (LamISIL AT) 1 % cream; Apply  topically to the appropriate area as directed Every Night. X 4 weeks    Other orders  -     montelukast (SINGULAIR) 10 MG tablet  -     fluticasone (FLONASE) 50 MCG/ACT nasal spray; 2 sprays into the nostril(s) as directed by provider Daily.  -     esomeprazole (nexIUM) 20 MG capsule; Take 20 mg by mouth Every Morning Before Breakfast.  -     NON FORMULARY; Chlortrimeton 4mg 1 tab po prn        Return in about 1 year (around 5/25/2022) for Annual physical.      Discussion:  Enzo Sewell is a 45 y.o. male RTC in yearly CPE, review of medical issues:    1. DDD at L4-L5 and hx of disc herniation now s/p Left L5-S1 laminectomy/discectomy with Metrx 8/7/17 with Dr. Ricky BOYCE.  Rare pain with lifting, PRN Aleve only.   2. HLD with moderate CV risk and elevated hsCRP - good tolerance of moderate dose Livalo, past atorvastatin intolerance recalled. Off med for months after self D/C; LDL at 247. D/W likely genetic element of lipids and absolute need for statins long term.  Restart Livalo 2mg daily. Trend lipids and LFT's in 8 weeks.   3. Cough variant asthma with positive bronchoprovocation study in 2013 - off inhalers and no use of PRN rescue last year.   --> Chronic cough - saw allergist recently for progression of chronic cough. Skin testing minimally reactive, ? Element of reflux and RAD.  On 3 drug regimen now per 5/2021 note and will f/u with allergy in 6/2021.  CXR today given flare of cough recently, f/u after rad report returns and will forward to allergy.   4. Rosacea - Off Soolantra and Oracea with good control over on daily sunscreen.    5. Hx of Depressed Mood - mood is good despite stressors in 2020 and grief issues with passing of in-laws.   Monitor.   6. GERD, new mention of dysphagia - largely resolved years ago however had some intermittent sx of L chest pressure at rest in past and now chronic cough issues.  On PPI per allergy.  PT mentions today long hx of dysphagia and requiring large volumes of water with meals.  Given above issues and dysphagia, pt needs EGD eval consideration and referral to GI placed today.   7. Vitamin D deficiency - c/w 1000 I.U. daily Vitamin D3 daily, trend next labs.   8. Hx of Snoring - resolved s/p uvulectomy and septoplasty in 2008 and with weight loss. AUSTEN.  9. Tinea corporis vs. Focal eczema - R posterior calf, new mention today.  Reponse to short course 4-5 days of OTC anti-fungal (pt directed OTC). C/W Lamisil AT daily for 4 weeks to resolution, call if not better or resolved.   10. HM - labs d/w pt; Flu/ Tdap/ Hep B/ Hep A/ COVID - UTD; JOE OK today (FHx prostate CA), d/w pt PSA at age 50; C-scope at 45, refer today; Hep C Ab (-) 11/2020; add back exercise as able    RTC one year CPE, F labs prior  F labs 8 weeks (CMP, FLP)

## 2021-06-04 ENCOUNTER — HOSPITAL ENCOUNTER (OUTPATIENT)
Dept: GENERAL RADIOLOGY | Facility: HOSPITAL | Age: 46
Discharge: HOME | End: 2021-06-04
Admitting: INTERNAL MEDICINE

## 2021-06-04 DIAGNOSIS — R93.89 ABNORMAL CHEST X-RAY: ICD-10-CM

## 2021-06-04 DIAGNOSIS — R05.3 CHRONIC COUGH: ICD-10-CM

## 2021-06-04 PROCEDURE — 71046 X-RAY EXAM CHEST 2 VIEWS: CPT

## 2021-07-19 DIAGNOSIS — E78.2 MIXED HYPERLIPIDEMIA: Primary | ICD-10-CM

## 2021-07-22 LAB
ALBUMIN SERPL-MCNC: 4.9 G/DL (ref 3.5–5.2)
ALBUMIN/GLOB SERPL: 2.7 G/DL
ALP SERPL-CCNC: 61 U/L (ref 39–117)
ALT SERPL-CCNC: 17 U/L (ref 1–41)
AST SERPL-CCNC: 16 U/L (ref 1–40)
BILIRUB SERPL-MCNC: 1.9 MG/DL (ref 0–1.2)
BUN SERPL-MCNC: 23 MG/DL (ref 6–20)
BUN/CREAT SERPL: 19.5 (ref 7–25)
CALCIUM SERPL-MCNC: 9.8 MG/DL (ref 8.6–10.5)
CHLORIDE SERPL-SCNC: 104 MMOL/L (ref 98–107)
CHOLEST SERPL-MCNC: 203 MG/DL (ref 0–200)
CO2 SERPL-SCNC: 26.4 MMOL/L (ref 22–29)
CREAT SERPL-MCNC: 1.18 MG/DL (ref 0.76–1.27)
GLOBULIN SER CALC-MCNC: 1.8 GM/DL
GLUCOSE SERPL-MCNC: 88 MG/DL (ref 65–99)
HDLC SERPL-MCNC: 38 MG/DL (ref 40–60)
LDLC SERPL CALC-MCNC: 141 MG/DL (ref 0–100)
POTASSIUM SERPL-SCNC: 4.6 MMOL/L (ref 3.5–5.2)
PROT SERPL-MCNC: 6.7 G/DL (ref 6–8.5)
SODIUM SERPL-SCNC: 140 MMOL/L (ref 136–145)
TRIGL SERPL-MCNC: 135 MG/DL (ref 0–150)
VLDLC SERPL CALC-MCNC: 24 MG/DL (ref 5–40)

## 2021-07-23 ENCOUNTER — TELEPHONE (OUTPATIENT)
Dept: INTERNAL MEDICINE | Facility: CLINIC | Age: 46
End: 2021-07-23

## 2021-07-23 NOTE — TELEPHONE ENCOUNTER
----- Message from Jesse Witt MD sent at 7/22/2021  6:04 PM EDT -----  Results called to pt 07/22/21  LDL down > 100 points on med, good news. LDL at 141. LFT's OK. C/W statin med as he is. CV exercise to raise HDL.

## 2021-09-23 ENCOUNTER — PREP FOR SURGERY (OUTPATIENT)
Dept: OTHER | Facility: HOSPITAL | Age: 46
End: 2021-09-23

## 2021-09-23 ENCOUNTER — OFFICE VISIT (OUTPATIENT)
Dept: GASTROENTEROLOGY | Facility: CLINIC | Age: 46
End: 2021-09-23

## 2021-09-23 VITALS — BODY MASS INDEX: 23.99 KG/M2 | TEMPERATURE: 97.3 F | HEIGHT: 73 IN | WEIGHT: 181 LBS

## 2021-09-23 DIAGNOSIS — R13.19 ESOPHAGEAL DYSPHAGIA: Primary | ICD-10-CM

## 2021-09-23 DIAGNOSIS — Z12.11 ENCOUNTER FOR SCREENING FOR MALIGNANT NEOPLASM OF COLON: ICD-10-CM

## 2021-09-23 DIAGNOSIS — R05.3 CHRONIC COUGH: ICD-10-CM

## 2021-09-23 DIAGNOSIS — K21.9 GASTROESOPHAGEAL REFLUX DISEASE, UNSPECIFIED WHETHER ESOPHAGITIS PRESENT: Primary | ICD-10-CM

## 2021-09-23 DIAGNOSIS — Z13.9 ENCOUNTER FOR SCREENING: ICD-10-CM

## 2021-09-23 PROCEDURE — 99203 OFFICE O/P NEW LOW 30 MIN: CPT | Performed by: INTERNAL MEDICINE

## 2021-09-23 NOTE — PROGRESS NOTES
Subjective   Chief Complaint   Patient presents with   • Heartburn   • Cough       Enzo Sewell is a  45 y.o. male here for heartburn, chronic cough, history of dysphagia.    He reports coughing after certain foods such as cookies and bread.  He has the sensation that food sticks in his chest at times.  He has negative allergy skin testing.  He has issues with bread and sweets.  Rare feeling of heartburn and no acid regurgitation.  He did feel like his symptoms were more controlled when he was on nexium - he tried this for a month.        HPI  Past Medical History:   Diagnosis Date   • Cough variant asthma     normal PFT's at baseline with positive bronchoprovocation study in 2013   • DDD (degenerative disc disease), lumbar     s/p epidural in 2013; more distant hx of disc herniation to L S1 nerve root in 2008 largely resorbed on 2013 MRI.    • Displacement of lumbar disc with radiculopathy 7/17/2017    S/p L5-S1 discectomy   • GERD (gastroesophageal reflux disease)    • Gilbert's syndrome     suspected with variable indirect elevations of bilirubin on labs   • History of kidney stones     x 3; last in ~2017   • Hyperlipidemia    • Low back pain    • Lumbar radiculopathy     s/p epidural in 2013   • Nephrolithiasis     s/p stent and retreival 3/2013   • Rosacea    • Snoring 10/7/2016    Resolved with weight loss in 2017; hx of uvulectomy and septoplasty in 2008   • Vitamin D deficiency      Past Surgical History:   Procedure Laterality Date   • CYSTOSCOPY      with insertion of ureteral stent 2013   • EPIDURAL BLOCK     • EYE SURGERY      lasik bilateral 2013   • LUMBAR DISCECTOMY FUSION INSTRUMENTATION Left 8/7/2017    Procedure: Left L5-S1 laminectomy/discectomy with Metrix;  Surgeon: Corey García MD;  Location: [facility];  Service:    • SEPTOPLASTY      2008 with T&A   • TONSILLECTOMY AND ADENOIDECTOMY      2008   • VASECTOMY      2010       Current Outpatient Medications:   •  albuterol (PROVENTIL  HFA;VENTOLIN HFA) 108 (90 BASE) MCG/ACT inhaler, Inhale 2 puffs Every 4 (Four) Hours As Needed for Wheezing or Shortness of Air., Disp: , Rfl:   •  Cholecalciferol (VITAMIN D-3) 1000 UNITS capsule, Take 1,000 Units by mouth Daily., Disp: , Rfl:   •  esomeprazole (nexIUM) 20 MG capsule, Take 20 mg by mouth Every Morning Before Breakfast., Disp: , Rfl:   •  fluticasone (FLONASE) 50 MCG/ACT nasal spray, 2 sprays into the nostril(s) as directed by provider Daily., Disp: , Rfl:   •  NON FORMULARY, Chlortrimeton 4mg 1 tab po prn, Disp: , Rfl:   •  pitavastatin calcium (Livalo) 2 MG tablet tablet, Take 1 tablet by mouth Every Night., Disp: 90 tablet, Rfl: 3  PRN Meds:.  No Known Allergies  Social History     Socioeconomic History   • Marital status:      Spouse name: Not on file   • Number of children: 2   • Years of education: MASTERS   • Highest education level: Not on file   Tobacco Use   • Smoking status: Former Smoker     Packs/day: 0.25     Years: 2.00     Pack years: 0.50     Types: Cigarettes     Quit date: 1991     Years since quittin.3   • Smokeless tobacco: Never Used   • Tobacco comment: 1 pk/yr in past quit   Vaping Use   • Vaping Use: Never used   Substance and Sexual Activity   • Alcohol use: Yes     Alcohol/week: 0.0 standard drinks     Comment: 1-2 drinks a month   • Drug use: No   • Sexual activity: Yes     Partners: Female     Comment: wife only; no hx STD's     Family History   Problem Relation Age of Onset   • Hyperlipidemia Mother    • Rosacea Mother    • Heart disease Father    • Prostate cancer Father    • Hyperlipidemia Father    • Nephrolithiasis Father    • Hyperlipidemia Brother    • Diabetes Brother    • No Known Problems Daughter    • Parkinsonism Paternal Grandmother    • Malig Hyperthermia Neg Hx      Review of Systems   Constitutional: Negative for appetite change and unexpected weight change.   HENT: Positive for trouble swallowing.    Gastrointestinal: Negative for  abdominal pain and nausea.     Vitals:    09/23/21 1003   Temp: 97.3 °F (36.3 °C)         09/23/21  1003   Weight: 82.1 kg (181 lb)       Objective   Physical Exam  Constitutional:       Appearance: Normal appearance.   HENT:      Head: Normocephalic and atraumatic.   Pulmonary:      Effort: Pulmonary effort is normal.   Abdominal:      General: There is no distension.   Neurological:      Mental Status: He is alert.       No radiology results for the last 7 days    Assessment/Plan   Diagnoses and all orders for this visit:    Gastroesophageal reflux disease, unspecified whether esophagitis present  -     Celiac Ab tTG DGP TIgA    Chronic cough  -     Celiac Ab tTG DGP TIgA    Encounter for screening for malignant neoplasm of colon      Plan:  · Hold acid suppression until we can complete his EGD.  I am concerned for possible eosinophilic esophagitis.  Plan to take biopsies at the time of his EGD.  · He is due for age-related colon cancer screening we will perform a colonoscopy at that time  · We will check a celiac panel given that most of his symptoms are related to consuming wheat related foods

## 2021-09-24 LAB
GLIADIN PEPTIDE IGA SER-ACNC: 3 UNITS (ref 0–19)
GLIADIN PEPTIDE IGG SER-ACNC: 2 UNITS (ref 0–19)
IGA SERPL-MCNC: 68 MG/DL (ref 90–386)
TTG IGA SER-ACNC: <2 U/ML (ref 0–3)
TTG IGG SER-ACNC: <2 U/ML (ref 0–5)

## 2021-09-29 ENCOUNTER — TELEPHONE (OUTPATIENT)
Dept: GASTROENTEROLOGY | Facility: CLINIC | Age: 46
End: 2021-09-29

## 2021-09-29 NOTE — TELEPHONE ENCOUNTER
----- Message from Genia Hay MD sent at 9/28/2021  4:53 PM EDT -----  Celiac panel was normal.  Recommend EGD as discussed and screening colonoscopy at his convenience

## 2021-09-30 NOTE — TELEPHONE ENCOUNTER
Called pt and advised of Dr. Hay's note.  Pt verb understanding and states he has been in contact with  Central Scheduling to set up scopes.

## 2021-10-11 ENCOUNTER — TELEPHONE (OUTPATIENT)
Dept: GASTROENTEROLOGY | Facility: CLINIC | Age: 46
End: 2021-10-11

## 2021-10-11 NOTE — TELEPHONE ENCOUNTER
[facility] patient for EGD/CS. Scheduled at [facility] on 01/21/2022 arrival at 9:00am. Prep packet mailed to patient. Spoke with Cony--Curtis

## 2022-01-31 ENCOUNTER — LAB REQUISITION (OUTPATIENT)
Dept: LAB | Facility: HOSPITAL | Age: 47
End: 2022-01-31

## 2022-01-31 DIAGNOSIS — Z00.00 ENCOUNTER FOR GENERAL ADULT MEDICAL EXAMINATION WITHOUT ABNORMAL FINDINGS: ICD-10-CM

## 2022-01-31 LAB — SARS-COV-2 ORF1AB RESP QL NAA+PROBE: NOT DETECTED

## 2022-01-31 PROCEDURE — U0004 COV-19 TEST NON-CDC HGH THRU: HCPCS | Performed by: INTERNAL MEDICINE

## 2022-02-01 ENCOUNTER — TELEPHONE (OUTPATIENT)
Dept: GASTROENTEROLOGY | Facility: CLINIC | Age: 47
End: 2022-02-01

## 2022-03-10 ENCOUNTER — OUTSIDE FACILITY SERVICE (OUTPATIENT)
Dept: GASTROENTEROLOGY | Facility: CLINIC | Age: 47
End: 2022-03-10

## 2022-03-10 PROCEDURE — 45380 COLONOSCOPY AND BIOPSY: CPT | Performed by: INTERNAL MEDICINE

## 2022-03-10 PROCEDURE — 43239 EGD BIOPSY SINGLE/MULTIPLE: CPT | Performed by: INTERNAL MEDICINE

## 2022-03-21 NOTE — PROGRESS NOTES
Esophageal biopsies reflect changes consistent w/eosinophilic esophagitis - changes are mild.  This is a type of inflammation that can cause issues with swallowing.  It often improves with medication - recommend increaing nexium to twice daily.  Office f/u in 3 months (rx sent to pharmacy)    The polyp(s) biopsies showed adenomatous change. This is not cancerous but is considered potentially precancerous. Follow-up colonoscopy in 10 years is advised.

## 2022-03-22 ENCOUNTER — TELEPHONE (OUTPATIENT)
Dept: GASTROENTEROLOGY | Facility: CLINIC | Age: 47
End: 2022-03-22

## 2022-03-22 NOTE — PROGRESS NOTES
Correction to previous note -   The polyp(s) showed hyperplastic change, which is non-cancerous and not pre-cancerous. Follow-up colonoscopy in 10 years is advised. No adenomatous change was seen.

## 2022-03-22 NOTE — TELEPHONE ENCOUNTER
----- Message from Genia Hay MD sent at 3/21/2022  8:17 AM EDT -----  Esophageal biopsies reflect changes consistent w/eosinophilic esophagitis - changes are mild.  This is a type of inflammation that can cause issues with swallowing.  It often improves with medication - recommend increaing nexium to twice daily.  Office f/u in 3 months (rx sent to pharmacy)    The polyp(s) biopsies showed adenomatous change. This is not cancerous but is considered potentially precancerous. Follow-up colonoscopy in 10 years is advised.

## 2022-03-22 NOTE — TELEPHONE ENCOUNTER
Correction-  The polyp(s) showed hyperplastic change, which is non-cancerous and not pre-cancerous. Follow-up colonoscopy in 10 years is advised. No adenomatous change was seen

## 2022-03-22 NOTE — TELEPHONE ENCOUNTER
Called pt and advised of Dr Hay's note.  Verb understanding.     C/s placed in recall and hm for 03/10/2032.

## 2022-05-02 ENCOUNTER — LAB REQUISITION (OUTPATIENT)
Dept: LAB | Facility: HOSPITAL | Age: 47
End: 2022-05-02

## 2022-05-02 DIAGNOSIS — N20.0 CALCULUS OF KIDNEY: ICD-10-CM

## 2022-05-02 LAB
ANION GAP SERPL CALCULATED.3IONS-SCNC: 14 MMOL/L (ref 5–15)
BASOPHILS # BLD AUTO: 0 10*3/MM3 (ref 0–0.2)
BASOPHILS NFR BLD AUTO: 0.2 % (ref 0–1.5)
BUN SERPL-MCNC: 22 MG/DL (ref 6–20)
BUN/CREAT SERPL: 19.3 (ref 7–25)
CALCIUM SPEC-SCNC: 9.4 MG/DL (ref 8.6–10.5)
CHLORIDE SERPL-SCNC: 103 MMOL/L (ref 98–107)
CO2 SERPL-SCNC: 23 MMOL/L (ref 22–29)
CREAT SERPL-MCNC: 1.14 MG/DL (ref 0.76–1.27)
DEPRECATED RDW RBC AUTO: 40.3 FL (ref 37–54)
EGFRCR SERPLBLD CKD-EPI 2021: 80.3 ML/MIN/1.73
EOSINOPHIL # BLD AUTO: 0 10*3/MM3 (ref 0–0.4)
EOSINOPHIL NFR BLD AUTO: 0.1 % (ref 0.3–6.2)
ERYTHROCYTE [DISTWIDTH] IN BLOOD BY AUTOMATED COUNT: 13.5 % (ref 12.3–15.4)
GLUCOSE SERPL-MCNC: 116 MG/DL (ref 65–99)
HCT VFR BLD AUTO: 45.2 % (ref 37.5–51)
HGB BLD-MCNC: 14.7 G/DL (ref 13–17.7)
LYMPHOCYTES # BLD AUTO: 0.7 10*3/MM3 (ref 0.7–3.1)
LYMPHOCYTES NFR BLD AUTO: 5.1 % (ref 19.6–45.3)
MCH RBC QN AUTO: 28.1 PG (ref 26.6–33)
MCHC RBC AUTO-ENTMCNC: 32.4 G/DL (ref 31.5–35.7)
MCV RBC AUTO: 86.7 FL (ref 79–97)
MONOCYTES # BLD AUTO: 0.6 10*3/MM3 (ref 0.1–0.9)
MONOCYTES NFR BLD AUTO: 4.7 % (ref 5–12)
NEUTROPHILS NFR BLD AUTO: 11.5 10*3/MM3 (ref 1.7–7)
NEUTROPHILS NFR BLD AUTO: 89.9 % (ref 42.7–76)
NRBC BLD AUTO-RTO: 0 /100 WBC (ref 0–0.2)
PLATELET # BLD AUTO: 242 10*3/MM3 (ref 140–450)
PMV BLD AUTO: 7.6 FL (ref 6–12)
POTASSIUM SERPL-SCNC: 4.5 MMOL/L (ref 3.5–5.2)
RBC # BLD AUTO: 5.22 10*6/MM3 (ref 4.14–5.8)
SODIUM SERPL-SCNC: 140 MMOL/L (ref 136–145)
WBC NRBC COR # BLD: 12.8 10*3/MM3 (ref 3.4–10.8)

## 2022-05-02 PROCEDURE — 85025 COMPLETE CBC W/AUTO DIFF WBC: CPT | Performed by: UROLOGY

## 2022-05-02 PROCEDURE — 80048 BASIC METABOLIC PNL TOTAL CA: CPT | Performed by: UROLOGY

## 2022-05-18 DIAGNOSIS — Z00.00 HEALTHCARE MAINTENANCE: Primary | ICD-10-CM

## 2022-05-18 DIAGNOSIS — E78.2 MIXED HYPERLIPIDEMIA: ICD-10-CM

## 2022-05-18 DIAGNOSIS — E55.9 VITAMIN D DEFICIENCY: ICD-10-CM

## 2022-05-18 DIAGNOSIS — E80.4 GILBERT'S SYNDROME: ICD-10-CM

## 2022-05-25 LAB
25(OH)D3+25(OH)D2 SERPL-MCNC: 26.2 NG/ML (ref 30–100)
ALBUMIN SERPL-MCNC: 4.7 G/DL (ref 4–5)
ALBUMIN/GLOB SERPL: 2.2 {RATIO} (ref 1.2–2.2)
ALP SERPL-CCNC: 58 IU/L (ref 44–121)
ALT SERPL-CCNC: 13 IU/L (ref 0–44)
APPEARANCE UR: CLEAR
AST SERPL-CCNC: 15 IU/L (ref 0–40)
BACTERIA #/AREA URNS HPF: NORMAL /[HPF]
BASOPHILS # BLD AUTO: 0.1 X10E3/UL (ref 0–0.2)
BASOPHILS NFR BLD AUTO: 2 %
BILIRUB SERPL-MCNC: 0.9 MG/DL (ref 0–1.2)
BILIRUB UR QL STRIP: NEGATIVE
BUN SERPL-MCNC: 19 MG/DL (ref 6–24)
BUN/CREAT SERPL: 14 (ref 9–20)
CALCIUM SERPL-MCNC: 9.9 MG/DL (ref 8.7–10.2)
CASTS URNS QL MICRO: NORMAL /LPF
CHLORIDE SERPL-SCNC: 105 MMOL/L (ref 96–106)
CHOLEST SERPL-MCNC: 230 MG/DL (ref 100–199)
CO2 SERPL-SCNC: 23 MMOL/L (ref 20–29)
COLOR UR: YELLOW
CREAT SERPL-MCNC: 1.33 MG/DL (ref 0.76–1.27)
EGFRCR SERPLBLD CKD-EPI 2021: 67 ML/MIN/1.73
EOSINOPHIL # BLD AUTO: 0.5 X10E3/UL (ref 0–0.4)
EOSINOPHIL NFR BLD AUTO: 8 %
EPI CELLS #/AREA URNS HPF: NORMAL /HPF (ref 0–10)
ERYTHROCYTE [DISTWIDTH] IN BLOOD BY AUTOMATED COUNT: 12.9 % (ref 11.6–15.4)
GLOBULIN SER CALC-MCNC: 2.1 G/DL (ref 1.5–4.5)
GLUCOSE SERPL-MCNC: 95 MG/DL (ref 65–99)
GLUCOSE UR QL STRIP: NEGATIVE
HCT VFR BLD AUTO: 43.3 % (ref 37.5–51)
HDLC SERPL-MCNC: 44 MG/DL
HGB BLD-MCNC: 14.6 G/DL (ref 13–17.7)
HGB UR QL STRIP: NEGATIVE
IMM GRANULOCYTES # BLD AUTO: 0 X10E3/UL (ref 0–0.1)
IMM GRANULOCYTES NFR BLD AUTO: 0 %
KETONES UR QL STRIP: NEGATIVE
LDLC SERPL CALC-MCNC: 155 MG/DL (ref 0–99)
LEUKOCYTE ESTERASE UR QL STRIP: NEGATIVE
LYMPHOCYTES # BLD AUTO: 2.2 X10E3/UL (ref 0.7–3.1)
LYMPHOCYTES NFR BLD AUTO: 39 %
MCH RBC QN AUTO: 28.7 PG (ref 26.6–33)
MCHC RBC AUTO-ENTMCNC: 33.7 G/DL (ref 31.5–35.7)
MCV RBC AUTO: 85 FL (ref 79–97)
MICRO URNS: NORMAL
MICRO URNS: NORMAL
MONOCYTES # BLD AUTO: 0.4 X10E3/UL (ref 0.1–0.9)
MONOCYTES NFR BLD AUTO: 7 %
NEUTROPHILS # BLD AUTO: 2.5 X10E3/UL (ref 1.4–7)
NEUTROPHILS NFR BLD AUTO: 44 %
NITRITE UR QL STRIP: NEGATIVE
PH UR STRIP: 5.5 [PH] (ref 5–7.5)
PLATELET # BLD AUTO: 276 X10E3/UL (ref 150–450)
POTASSIUM SERPL-SCNC: 4.6 MMOL/L (ref 3.5–5.2)
PROT SERPL-MCNC: 6.8 G/DL (ref 6–8.5)
PROT UR QL STRIP: NEGATIVE
RBC # BLD AUTO: 5.09 X10E6/UL (ref 4.14–5.8)
RBC #/AREA URNS HPF: NORMAL /HPF (ref 0–2)
SODIUM SERPL-SCNC: 142 MMOL/L (ref 134–144)
SP GR UR STRIP: 1.02 (ref 1–1.03)
TRIGL SERPL-MCNC: 171 MG/DL (ref 0–149)
TSH SERPL DL<=0.005 MIU/L-ACNC: 1.02 UIU/ML (ref 0.45–4.5)
URINALYSIS REFLEX: NORMAL
UROBILINOGEN UR STRIP-MCNC: 0.2 MG/DL (ref 0.2–1)
VLDLC SERPL CALC-MCNC: 31 MG/DL (ref 5–40)
WBC # BLD AUTO: 5.7 X10E3/UL (ref 3.4–10.8)
WBC #/AREA URNS HPF: NORMAL /HPF (ref 0–5)

## 2022-05-29 DIAGNOSIS — E78.2 MIXED HYPERLIPIDEMIA: ICD-10-CM

## 2022-05-31 RX ORDER — PITAVASTATIN CALCIUM 2.09 MG/1
TABLET, FILM COATED ORAL
Qty: 90 TABLET | Refills: 3 | Status: SHIPPED | OUTPATIENT
Start: 2022-05-31

## 2022-06-01 ENCOUNTER — OFFICE VISIT (OUTPATIENT)
Dept: INTERNAL MEDICINE | Facility: CLINIC | Age: 47
End: 2022-06-01

## 2022-06-01 ENCOUNTER — TELEPHONE (OUTPATIENT)
Dept: GASTROENTEROLOGY | Facility: CLINIC | Age: 47
End: 2022-06-01

## 2022-06-01 VITALS
OXYGEN SATURATION: 98 % | SYSTOLIC BLOOD PRESSURE: 120 MMHG | RESPIRATION RATE: 12 BRPM | WEIGHT: 176 LBS | BODY MASS INDEX: 23.33 KG/M2 | DIASTOLIC BLOOD PRESSURE: 70 MMHG | HEIGHT: 73 IN | HEART RATE: 80 BPM | TEMPERATURE: 96.6 F

## 2022-06-01 DIAGNOSIS — K20.0 EOSINOPHILIC ESOPHAGITIS: ICD-10-CM

## 2022-06-01 DIAGNOSIS — N20.0 NEPHROLITHIASIS: ICD-10-CM

## 2022-06-01 DIAGNOSIS — L20.89 OTHER ATOPIC DERMATITIS: ICD-10-CM

## 2022-06-01 DIAGNOSIS — Z91.09 ENVIRONMENTAL ALLERGIES: ICD-10-CM

## 2022-06-01 DIAGNOSIS — J45.991 COUGH VARIANT ASTHMA: ICD-10-CM

## 2022-06-01 DIAGNOSIS — E80.4 GILBERT'S SYNDROME: ICD-10-CM

## 2022-06-01 DIAGNOSIS — M51.36 DDD (DEGENERATIVE DISC DISEASE), LUMBAR: ICD-10-CM

## 2022-06-01 DIAGNOSIS — I73.00 RAYNAUD'S PHENOMENON WITHOUT GANGRENE: ICD-10-CM

## 2022-06-01 DIAGNOSIS — Z00.00 HEALTHCARE MAINTENANCE: Primary | ICD-10-CM

## 2022-06-01 DIAGNOSIS — E78.2 MIXED HYPERLIPIDEMIA: ICD-10-CM

## 2022-06-01 DIAGNOSIS — L71.9 ROSACEA: ICD-10-CM

## 2022-06-01 DIAGNOSIS — K21.9 GASTROESOPHAGEAL REFLUX DISEASE, UNSPECIFIED WHETHER ESOPHAGITIS PRESENT: ICD-10-CM

## 2022-06-01 DIAGNOSIS — E55.9 VITAMIN D DEFICIENCY: ICD-10-CM

## 2022-06-01 PROCEDURE — 99396 PREV VISIT EST AGE 40-64: CPT | Performed by: INTERNAL MEDICINE

## 2022-06-01 RX ORDER — OMEGA-3/DHA/EPA/FISH OIL 300-1000MG
CAPSULE ORAL DAILY
COMMUNITY

## 2022-06-01 NOTE — PROGRESS NOTES
"Annual Exam (Review of medical issues )      HPI  Enzo Sewell is a 46 y.o. male RTC in yearly CPE, review of medical issues:  Has been doing OK until had issues with kidney stone. Committed back to exercise in 1/2022 and got new rowing machine at home.  Is on pause with kidney stone issue.  Felt like health had been good.  Is back on exercise now after stone extraction.   1. Distal right ureteral stone with severe renal colic - s/p Ureteroscopy and stone extraction with double-J stent placement 5/5/22. This was 5th stone.   Stent is out now.  Had 2 weeks of issues and pain with this one, had to see ED twice. Thinks was 'calcium' stone.  Feels like was due to 'the diet soda'.  Had gotten back on that during pandemic as had been off that. \"Has purged the house of that'.  No residual pain or blood now. Has 1mm stone on L, will have XR in 7/2022, but thinks may have already passed.   2. DDD at L4-L5 and hx of disc herniation now s/p Left L5-S1 laminectomy/discectomy with Metrx 8/7/17 with Dr. Ricky BOYCE. \"Back is fine\".  Still working on posture on rowing machine.  3. HLD with moderate CV risk and elevated hsCRP - good tolerance of moderate dose Livalo. Has missed med during kidney stone.  Is back on exercise in last week after better routine this past 6 months.   4. Cough variant asthma with positive bronchoprovocation study in 2013 - off inhalers and no use of PRN rescue last year. No wheezing or SOA.  Can exercise and cut grass and no issues. Those activities never trigger cough, really cough comes from diet.   5. GERD, new mention of dysphagia - had EGD over year, 'she did not find anything\". Has doubled up on PPI dosing and seems to help with cough.  Feels like still has certain foods that trigger coughing fit.  6. Vitamin D deficiency - on 1000 I.U. daily Vitamin D3 daily.        Review of Systems   Constitutional: Negative for chills, fever, malaise/fatigue, weight gain and weight loss (wtih kidney stone " issues, had been stable prior. ).   HENT: Negative for congestion, hearing loss, sore throat and stridor.    Eyes: Negative for discharge, double vision, pain and redness.        Last eye exam > 5 years ago; s/p lasik     Cardiovascular: Negative for chest pain, dyspnea on exertion, irregular heartbeat, leg swelling, near-syncope, palpitations and syncope.   Respiratory: Positive for cough (variable). Negative for shortness of breath and wheezing.    Endocrine: Negative for polydipsia, polyphagia and polyuria.   Hematologic/Lymphatic: Negative for bleeding problem. Does not bruise/bleed easily.   Skin: Positive for rash (on front and back of calf, 'kind of flares up'. ; unlcear if contact trigger. Worse in winter. No OTC meds makes better. ). Negative for suspicious lesions.   Musculoskeletal: Negative for back pain, joint pain, joint swelling, muscle cramps, muscle weakness and myalgias.   Gastrointestinal: Negative for constipation, diarrhea, dysphagia, heartburn, nausea and vomiting.   Genitourinary: Negative for dysuria, frequency, hematuria, hesitancy and incomplete emptying.        Dealing with some less stamina sexually with erectile function.  Able to get erection, 'not strong or cannot hold it long'.    Sees urology upcoming.    Neurological: Negative for dizziness, headaches and light-headedness.   Allergic/Immunologic: Positive for environmental allergies. Negative for persistent infections.       Problem List:    Patient Active Problem List   Diagnosis   • Gastroesophageal reflux disease   • Cough variant asthma   • Hyperlipidemia   • Gilbert's syndrome   • Rosacea   • Vitamin D deficiency   • Environmental allergies   • DDD (degenerative disc disease), lumbar   • Nephrolithiasis   • Other atopic dermatitis   • Raynaud's phenomenon without gangrene   • Eosinophilic esophagitis       Medical History:    Past Medical History:   Diagnosis Date   • Cough variant asthma     normal PFT's at baseline with  positive bronchoprovocation study in 2013   • DDD (degenerative disc disease), lumbar     s/p epidural in ; more distant hx of disc herniation to L S1 nerve root in  largely resorbed on 2013 MRI.    • Displacement of lumbar disc with radiculopathy 2017    S/p L5-S1 discectomy   • GERD (gastroesophageal reflux disease)    • Gilbert's syndrome     suspected with variable indirect elevations of bilirubin on labs   • History of kidney stones     x 3; last in ~2017   • Hyperlipidemia    • Low back pain    • Lumbar radiculopathy     s/p epidural in    • Nephrolithiasis     s/p stent and retreival 3/2013   • Rosacea    • Snoring 10/7/2016    Resolved with weight loss in ; hx of uvulectomy and septoplasty in    • Vitamin D deficiency         Social History:    Social History     Socioeconomic History   • Marital status:    • Number of children: 2   • Years of education: [employer]   Tobacco Use   • Smoking status: Former Smoker     Packs/day: 0.25     Years: 2.00     Pack years: 0.50     Types: Cigarettes     Quit date: 1991     Years since quittin.0   • Smokeless tobacco: Never Used   • Tobacco comment: 1 pk/yr in past quit   Vaping Use   • Vaping Use: Never used   Substance and Sexual Activity   • Alcohol use: Yes     Alcohol/week: 0.0 standard drinks     Comment: 1-2 drinks a month   • Drug use: No   • Sexual activity: Yes     Partners: Female     Comment: wife only; no hx STD's       Family History:   Family History   Problem Relation Age of Onset   • Hyperlipidemia Mother    • Rosacea Mother    • Heart disease Father    • Prostate cancer Father    • Hyperlipidemia Father    • Nephrolithiasis Father    • Hyperlipidemia Brother    • Diabetes Brother    • No Known Problems Daughter    • Parkinsonism Paternal Grandmother    • Malig Hyperthermia Neg Hx        Surgical History:   Past Surgical History:   Procedure Laterality Date   • CYSTOSCOPY      with insertion of ureteral stent     • EPIDURAL BLOCK     • EYE SURGERY      lasik bilateral 2013   • LUMBAR DISCECTOMY FUSION INSTRUMENTATION Left 08/07/2017    Procedure: Left L5-S1 laminectomy/discectomy with Metrix;  Surgeon: Corey García MD;  Location: [facility] OR;  Service:    • SEPTOPLASTY      2008 with T&A   • TONSILLECTOMY AND ADENOIDECTOMY      2008   • URETEROSCOPY LASER LITHOTRIPSY WITH STENT INSERTION Right 05/2022   • VASECTOMY      2010         Current Outpatient Medications:   •  Cholecalciferol (VITAMIN D-3) 1000 UNITS capsule, Take 1,000 Units by mouth Daily., Disp: , Rfl:   •  esomeprazole (nexIUM) 20 MG capsule, Take 1 capsule by mouth 2 (Two) Times a Day Before Meals., Disp: 180 capsule, Rfl: 1  •  fish oil-omega-3 fatty acids 1000 MG capsule, Take  by mouth Daily., Disp: , Rfl:   •  fluticasone (FLONASE) 50 MCG/ACT nasal spray, 2 sprays into the nostril(s) as directed by provider Daily., Disp: , Rfl:   •  Livalo 2 MG tablet tablet, TAKE 1 TABLET EVERY NIGHT, Disp: 90 tablet, Rfl: 3  •  albuterol (PROVENTIL HFA;VENTOLIN HFA) 108 (90 BASE) MCG/ACT inhaler, Inhale 2 puffs Every 4 (Four) Hours As Needed for Wheezing or Shortness of Air., Disp: , Rfl:   •  betamethasone valerate (VALISONE) 0.1 % cream, Apply 1 application topically to the appropriate area as directed 2 (Two) Times a Day. X 2 weeks maxiumum, Disp: 45 g, Rfl: 2    Vitals:    06/01/22 0853   BP: 120/70   Pulse: 80   Resp: 12   Temp: 96.6 °F (35.9 °C)   SpO2: 98%     Body mass index is 23.22 kg/m².    Physical Exam  Vitals reviewed.   Constitutional:       General: He is not in acute distress.     Appearance: Normal appearance. He is well-developed. He is not ill-appearing or toxic-appearing.   HENT:      Head: Normocephalic and atraumatic.      Right Ear: Hearing, tympanic membrane, ear canal and external ear normal.      Left Ear: Hearing and external ear normal.      Ears:      Comments: Dry cermuen, non-impacted, deep in L canal obscures view of TM     Nose:  Nose normal.      Mouth/Throat:      Mouth: Mucous membranes are moist. No oral lesions.      Pharynx: Oropharynx is clear. Uvula midline. No pharyngeal swelling, oropharyngeal exudate, posterior oropharyngeal erythema or uvula swelling.   Eyes:      General: Lids are normal. No scleral icterus.        Right eye: No discharge.         Left eye: No discharge.      Extraocular Movements: Extraocular movements intact.      Conjunctiva/sclera: Conjunctivae normal.      Pupils: Pupils are equal, round, and reactive to light.   Neck:      Thyroid: No thyroid mass or thyromegaly.      Vascular: No carotid bruit.   Cardiovascular:      Rate and Rhythm: Normal rate and regular rhythm.      Pulses:           Radial pulses are 2+ on the right side and 2+ on the left side.        Dorsalis pedis pulses are 2+ on the right side and 2+ on the left side.        Posterior tibial pulses are 2+ on the right side and 2+ on the left side.      Heart sounds: Normal heart sounds, S1 normal and S2 normal. No murmur heard.    No friction rub. No gallop.   Pulmonary:      Effort: Pulmonary effort is normal. No respiratory distress.      Breath sounds: Normal breath sounds. No wheezing, rhonchi or rales.   Abdominal:      General: Bowel sounds are normal. There is no distension.      Palpations: Abdomen is soft. There is no mass.      Tenderness: There is no abdominal tenderness. There is no guarding or rebound.      Hernia: There is no hernia in the left inguinal area.   Genitourinary:     Penis: Normal. No discharge.       Testes: Normal.         Right: Mass not present.         Left: Mass not present.   Musculoskeletal:         General: Normal range of motion.      Cervical back: Full passive range of motion without pain, normal range of motion and neck supple. No rigidity. No muscular tenderness.      Right lower leg: No edema.      Left lower leg: No edema.   Lymphadenopathy:      Cervical: No cervical adenopathy.   Skin:     General:  Skin is warm and dry.      Findings: No rash.          Neurological:      Mental Status: He is alert and oriented to person, place, and time.      Cranial Nerves: No cranial nerve deficit.      Sensory: No sensory deficit.      Motor: No weakness, tremor, atrophy or abnormal muscle tone.      Gait: Gait normal.      Deep Tendon Reflexes: Reflexes are normal and symmetric.      Reflex Scores:       Patellar reflexes are 2+ on the right side and 2+ on the left side.       Achilles reflexes are 2+ on the right side and 2+ on the left side.  Psychiatric:         Attention and Perception: Attention normal.         Mood and Affect: Mood normal.         Speech: Speech normal.         Behavior: Behavior normal. Behavior is cooperative.         Thought Content: Thought content normal.         Assessment/ Plan  Diagnoses and all orders for this visit:    Healthcare maintenance    Nephrolithiasis    DDD (degenerative disc disease), lumbar    Environmental allergies    Vitamin D deficiency    Rosacea    Gilbert's syndrome    Mixed hyperlipidemia    Cough variant asthma    Gastroesophageal reflux disease, unspecified whether esophagitis present    Other atopic dermatitis  -     betamethasone valerate (VALISONE) 0.1 % cream; Apply 1 application topically to the appropriate area as directed 2 (Two) Times a Day. X 2 weeks maxiumum    Raynaud's phenomenon without gangrene    Eosinophilic esophagitis    Other orders  -     fish oil-omega-3 fatty acids 1000 MG capsule; Take  by mouth Daily.        Return in about 1 year (around 6/1/2023) for Annual physical.      Discussion:  Enzo BRADEN Donato is a 46 y.o. male RTC in yearly CPE, review of medical issues:   1. Nephrolithiasis x 5,recent in 5/2022 with Distal right ureteral stone with severe renal colic s/p Ureteroscopy and stone extraction with double-J stent placement 5/5/22 - sx resolved, U/A clear today. Has urology f/u 7/2022 with XR.   2. DDD at L4-L5 and hx of disc herniation now  s/p Left L5-S1 laminectomy/discectomy with Metrx 8/7/17 with Dr. García -  [place]. Working on posture on rowing machine.PRN Aleve only.   3. HLD with moderate CV risk and elevated hsCRP, baseline  - good tolerance of moderate dose Livalo.  Likely genetic element of lipids and absolute need for statins long term.  C/W Livalo 2mg daily. LFT's OK.  4. Cough variant asthma with positive bronchoprovocation study in 2013, s/p allergist ~2021 progression of chronic cough and skin testing minimally reactive, ? Element of reflux and RAD - off inhalers and no use of PRN rescue > 1 year. Prevnar 20 due, will call when in stock.    5. Rosacea - Off Soolantra and Oracea with good control over on daily sunscreen.    6. GERD with dysphagia; EGD 3/2022 with EoE - on PPI BID now.  Will f/u GI in 6/2022.    7. Vitamin D deficiency - borderline on 1000 I.U. daily Vitamin D3 daily. C/W same. Trend next labs.   8. Hx of Snoring - resolved s/p uvulectomy and septoplasty in 2008 and with weight loss. [place].  9. Atopic dermatitis vs. Contact dermatitis - variable occurrence B lower legs, under sock line.  Unclear trigger.  Atopic hx (asthma and EoE) noted.  Minimal lesion today. Will send in betamethasone cream to use PRN for flares, 2 week max pulse dosing.   10. Raynauds Phenomenon - Long hx, mild sx.  Noted pre-op in 2022 with observed sx and difficulty getting IV with rewarming needed.  11. HM - labs d/w pt; Flu/ Tdap/ Hep B/ Hep A/ COVID - UTD; JOE per urology; C-scope 3/2022 (1 hyperplastic) --> 10 years; Hep C Ab (-) 11/2020; c/w exercise as he is.     RTC one year CPE, F labs prior

## 2022-06-01 NOTE — TELEPHONE ENCOUNTER
----- Message from Genia Hay MD sent at 6/1/2022 12:43 PM EDT -----  Regarding: f/u appt  Please see if he can follow-up with me on July 19 at 4 PM-I have tentatively scheduled him-thanks  Artem  ----- Message -----  From: Jesse Witt MD  Sent: 6/1/2022   9:59 AM EDT  To: MD Genia Ayers,   Saw your note on this patient. He was not really aware of EoE dx.  I reviewed with him today; really has pretty atopic pattern (EoE, dermatitis on legs, cough variant asthma).  Anyhow, does not have 3 month appt with you as you rec'juan. CARLOS so your office can get him scheduled.   JDK

## 2022-07-25 ENCOUNTER — OFFICE VISIT (OUTPATIENT)
Dept: GASTROENTEROLOGY | Facility: CLINIC | Age: 47
End: 2022-07-25

## 2022-07-25 VITALS
TEMPERATURE: 96.4 F | BODY MASS INDEX: 23.59 KG/M2 | DIASTOLIC BLOOD PRESSURE: 76 MMHG | SYSTOLIC BLOOD PRESSURE: 113 MMHG | HEIGHT: 73 IN | WEIGHT: 178 LBS | HEART RATE: 69 BPM

## 2022-07-25 DIAGNOSIS — R13.19 ESOPHAGEAL DYSPHAGIA: ICD-10-CM

## 2022-07-25 DIAGNOSIS — K20.0 ESOPHAGITIS, EOSINOPHILIC: Primary | ICD-10-CM

## 2022-07-25 DIAGNOSIS — Z86.010 HISTORY OF ADENOMATOUS POLYP OF COLON: ICD-10-CM

## 2022-07-25 PROCEDURE — 99214 OFFICE O/P EST MOD 30 MIN: CPT | Performed by: INTERNAL MEDICINE

## 2022-07-25 NOTE — PROGRESS NOTES
Subjective   Chief Complaint   Patient presents with   • EOE   • Heartburn       Enzo Sewell is a  46 y.o. male here for a follow up visit for heartburn, EOE.    Per his PCP he has other atopic symptoms including lower extremity dermatitis and cough variant asthma.    He reports episodic cough.  This happens at night.  Sometimes he has to get up and leave the room.  This has become more problematic in the era of COVID.  Food still sometimes sticks when he eats.  He felt like this got a lot better initially after the increase in Nexium but then the effect eventually waned.  Not as much heartburn.  He is on Nexium 20 mg twice a day.    We discussed the results of his recent EGD and colonoscopy.  Visibly the esophagus appeared normal.  No other upper GI abnormalities.  Biopsies were consistent with eosinophilic esophagitis.  There was no visible stricture.  He had a single adenomatous polyp in the rectum.  5-year  follow-up recommended.  HPI  Past Medical History:   Diagnosis Date   • Cough variant asthma     normal PFT's at baseline with positive bronchoprovocation study in 2013   • DDD (degenerative disc disease), lumbar     s/p epidural in 2013; more distant hx of disc herniation to L S1 nerve root in 2008 largely resorbed on 2013 MRI.    • Displacement of lumbar disc with radiculopathy 7/17/2017    S/p L5-S1 discectomy   • GERD (gastroesophageal reflux disease)    • Gilbert's syndrome     suspected with variable indirect elevations of bilirubin on labs   • History of kidney stones     x 3; last in ~2017   • Hyperlipidemia    • Low back pain    • Lumbar radiculopathy     s/p epidural in 2013   • Nephrolithiasis     s/p stent and retreival 3/2013   • Rosacea    • Snoring 10/7/2016    Resolved with weight loss in 2017; hx of uvulectomy and septoplasty in 2008   • Vitamin D deficiency      Past Surgical History:   Procedure Laterality Date   • CYSTOSCOPY      with insertion of ureteral stent 2013   • EPIDURAL  BLOCK     • EYE SURGERY      lasik bilateral    • LUMBAR DISCECTOMY FUSION INSTRUMENTATION Left 2017    Procedure: Left L5-S1 laminectomy/discectomy with Metrix;  Surgeon: Corey García MD;  Location: [facility] OR;  Service:    • SEPTOPLASTY       with T&A   • TONSILLECTOMY AND ADENOIDECTOMY         • URETEROSCOPY LASER LITHOTRIPSY WITH STENT INSERTION Right 2022   • VASECTOMY             Current Outpatient Medications:   •  albuterol (PROVENTIL HFA;VENTOLIN HFA) 108 (90 BASE) MCG/ACT inhaler, Inhale 2 puffs Every 4 (Four) Hours As Needed for Wheezing or Shortness of Air., Disp: , Rfl:   •  betamethasone valerate (VALISONE) 0.1 % cream, Apply 1 application topically to the appropriate area as directed 2 (Two) Times a Day. X 2 weeks maxiumum, Disp: 45 g, Rfl: 2  •  Cholecalciferol (VITAMIN D-3) 1000 UNITS capsule, Take 1,000 Units by mouth Daily., Disp: , Rfl:   •  esomeprazole (nexIUM) 20 MG capsule, Take 1 capsule by mouth 2 (Two) Times a Day Before Meals., Disp: 180 capsule, Rfl: 1  •  fish oil-omega-3 fatty acids 1000 MG capsule, Take  by mouth Daily., Disp: , Rfl:   •  fluticasone (FLONASE) 50 MCG/ACT nasal spray, 2 sprays into the nostril(s) as directed by provider Daily., Disp: , Rfl:   •  Livalo 2 MG tablet tablet, TAKE 1 TABLET EVERY NIGHT, Disp: 90 tablet, Rfl: 3  PRN Meds:.  No Known Allergies  Social History     Socioeconomic History   • Marital status:    • Number of children: 2   • Years of education: [employer]   Tobacco Use   • Smoking status: Former Smoker     Packs/day: 0.25     Years: 2.00     Pack years: 0.50     Types: Cigarettes     Quit date: 1991     Years since quittin.1   • Smokeless tobacco: Never Used   • Tobacco comment: 1 pk/yr in past quit   Vaping Use   • Vaping Use: Never used   Substance and Sexual Activity   • Alcohol use: Yes     Alcohol/week: 0.0 standard drinks     Comment: 1-2 drinks a month   • Drug use: No   • Sexual activity: Yes      Partners: Female     Comment: wife only; no hx STD's     Family History   Problem Relation Age of Onset   • Hyperlipidemia Mother    • Rosacea Mother    • Heart disease Father    • Prostate cancer Father    • Hyperlipidemia Father    • Nephrolithiasis Father    • Hyperlipidemia Brother    • Diabetes Brother    • No Known Problems Daughter    • Parkinsonism Paternal Grandmother    • Malig Hyperthermia Neg Hx      Review of Systems   Constitutional: Negative for appetite change and unexpected weight change.   HENT: Positive for trouble swallowing.    Gastrointestinal: Negative for abdominal pain and nausea.     Vitals:    07/25/22 1055   BP: 113/76   Pulse: 69   Temp: 96.4 °F (35.8 °C)         07/25/22  1055   Weight: 80.7 kg (178 lb)       Objective   Physical Exam  Constitutional:       Appearance: Normal appearance. He is well-developed.   HENT:      Head: Normocephalic and atraumatic.   Eyes:      General: No scleral icterus.     Conjunctiva/sclera: Conjunctivae normal.   Pulmonary:      Effort: Pulmonary effort is normal.   Abdominal:      General: There is no distension.      Palpations: Abdomen is soft.   Musculoskeletal:      Cervical back: Normal range of motion and neck supple.   Skin:     General: Skin is warm and dry.   Neurological:      Mental Status: He is alert.   Psychiatric:         Mood and Affect: Mood normal.         Behavior: Behavior normal.       No radiology results for the last 7 days    Assessment & Plan   Diagnoses and all orders for this visit:    Esophagitis, eosinophilic    Esophageal dysphagia    History of adenomatous polyp of colon      Plan:  · Continue Nexium twice daily  · Start swallowed steroids for EOE with persistent symptoms  · Could consider Dupixent-we discussed this therapy and he will research it  · Repeat colonoscopy in 5 years for screening

## 2022-08-01 ENCOUNTER — TELEPHONE (OUTPATIENT)
Dept: GASTROENTEROLOGY | Facility: CLINIC | Age: 47
End: 2022-08-01

## 2022-08-01 NOTE — TELEPHONE ENCOUNTER
Call from pt.  States [facility] is advising cannot fill fluticasone inhaler (see rx of 7/25).    Pt not sure why.    Call to Bhargavi  @ [phone] and spoke with Teagan.  States PA needed because 2 puffs 2x/day (higher than normal dosing).  Advise this for eosinophilic esophagitis.  Teagan states PA will be required - can be completed thru [facility].  States also notes insurance requires brand name advair.      Call to pt.  Advise PA will be completed.     Message to Aidan

## 2022-08-09 ENCOUNTER — TELEPHONE (OUTPATIENT)
Dept: GASTROENTEROLOGY | Facility: CLINIC | Age: 47
End: 2022-08-09

## 2022-08-09 RX ORDER — FLUTICASONE PROPIONATE 50 MCG
1 SPRAY, SUSPENSION (ML) NASAL 2 TIMES DAILY
Qty: 18.2 ML | Refills: 3 | Status: SHIPPED | OUTPATIENT
Start: 2022-08-09

## 2022-08-09 NOTE — TELEPHONE ENCOUNTER
Call to pt.  Advise per DR Hay note.  Verb understanding.     States was able to fill 7/25 fluticasone propionate 250 mcg aerosol powder and has been using for 1 wk.  Cost was $60.  Will continue 7/25 rx.     Update to DR Hay.

## 2022-08-09 NOTE — TELEPHONE ENCOUNTER
This is not the same product - he only needs the fluticasone - advair also contains salmeterol.  I have written it for lower dose to see if they will approve it

## 2022-08-09 NOTE — TELEPHONE ENCOUNTER
August 9, 2022    Marleni Lake MA  to Me    SC      8:03 AM   Insurance prefers Advair and will pay for it.     **Message to DR Hay.  Niya Thompson RN.

## 2022-08-09 NOTE — TELEPHONE ENCOUNTER
Caller: Jany Sewell    Relationship: Self    Best call back number: [phone]    What is the best time to reach you:ANY    Who are you requesting to speak with (clinical staff, provider,  specific staff member): WILFREDO    Do you know the name of the person who called: JANY    What was the call regarding: PT RETURNING A CALL FROM [facility] [place]    Do you require a callback: YES

## 2022-12-06 ENCOUNTER — OFFICE VISIT (OUTPATIENT)
Dept: GASTROENTEROLOGY | Facility: CLINIC | Age: 47
End: 2022-12-06

## 2022-12-06 VITALS
SYSTOLIC BLOOD PRESSURE: 115 MMHG | OXYGEN SATURATION: 98 % | HEART RATE: 77 BPM | HEIGHT: 73 IN | TEMPERATURE: 97.3 F | DIASTOLIC BLOOD PRESSURE: 74 MMHG | BODY MASS INDEX: 24.31 KG/M2 | WEIGHT: 183.4 LBS

## 2022-12-06 DIAGNOSIS — R13.19 ESOPHAGEAL DYSPHAGIA: ICD-10-CM

## 2022-12-06 DIAGNOSIS — R05.9 COUGH IN ADULT: ICD-10-CM

## 2022-12-06 DIAGNOSIS — K20.0 ESOPHAGITIS, EOSINOPHILIC: Primary | ICD-10-CM

## 2022-12-06 PROCEDURE — 99214 OFFICE O/P EST MOD 30 MIN: CPT | Performed by: INTERNAL MEDICINE

## 2022-12-06 RX ORDER — ESOMEPRAZOLE MAGNESIUM 40 MG/1
40 CAPSULE, DELAYED RELEASE ORAL
Qty: 180 CAPSULE | Refills: 1 | Status: SHIPPED | OUTPATIENT
Start: 2022-12-06

## 2022-12-06 NOTE — PROGRESS NOTES
Subjective   Chief Complaint   Patient presents with   • Medication Reaction       Enzo Sewell is a  47 y.o. male here for a follow up visit for heartburn, EOE.    Per his PCP he has other atopic symptoms including lower extremity dermatitis and cough variant asthma.    He tried fluticasone but it caused rash.  The rash resolved and he reattempted the inhaler and it recurred.  He discontinued it thereafter.      He tried a topical steroid cream for the rash on his legs which resulted in resolution of the rash almost immediately.    He has coughing after eating certain foods especially sugary foods, hummus, nuts, wheat, ice cream.  Negative skin test.    He reports episodic cough.  This happens at night.  Sometimes he has to get up and leave the room.  This has become more problematic in the era of COVID.  Food still sometimes sticks when he eats.  He felt like this got a lot better initially after the increase in Nexium but then the effect eventually waned.  Not as much heartburn.  He is on Nexium 20 mg twice a day.    EGD and colonoscopy 2/22-  Visibly the esophagus appeared normal.  No other upper GI abnormalities.  Biopsies were consistent with eosinophilic esophagitis.  There was no visible stricture.  He had a single adenomatous polyp in the rectum.  5-year follow-up recommended.  .   HPI  Past Medical History:   Diagnosis Date   • Cough variant asthma     normal PFT's at baseline with positive bronchoprovocation study in 2013   • DDD (degenerative disc disease), lumbar     s/p epidural in 2013; more distant hx of disc herniation to L S1 nerve root in 2008 largely resorbed on 2013 MRI.    • Displacement of lumbar disc with radiculopathy 7/17/2017    S/p L5-S1 discectomy   • GERD (gastroesophageal reflux disease)    • Gilbert's syndrome     suspected with variable indirect elevations of bilirubin on labs   • History of kidney stones     x 3; last in ~2017   • Hyperlipidemia    • Low back pain    • Lumbar  radiculopathy     s/p epidural in    • Nephrolithiasis     s/p stent and retreival 3/2013   • Rosacea    • Snoring 10/7/2016    Resolved with weight loss in ; hx of uvulectomy and septoplasty in    • Vitamin D deficiency      Past Surgical History:   Procedure Laterality Date   • COLONOSCOPY  2022   • CYSTOSCOPY      with insertion of ureteral stent    • EPIDURAL BLOCK     • EYE SURGERY      lasik bilateral    • LUMBAR DISCECTOMY FUSION INSTRUMENTATION Left 2017    Procedure: Left L5-S1 laminectomy/discectomy with Metrix;  Surgeon: Corey García MD;  Location: [facility] OR;  Service:    • SEPTOPLASTY       with T&A   • TONSILLECTOMY AND ADENOIDECTOMY         • UPPER GASTROINTESTINAL ENDOSCOPY  2022   • URETEROSCOPY LASER LITHOTRIPSY WITH STENT INSERTION Right 2022   • VASECTOMY             Current Outpatient Medications:   •  betamethasone valerate (VALISONE) 0.1 % cream, Apply 1 application topically to the appropriate area as directed 2 (Two) Times a Day. X 2 weeks maxiumum, Disp: 45 g, Rfl: 2  •  Cholecalciferol (VITAMIN D-3) 1000 UNITS capsule, Take 1,000 Units by mouth Daily., Disp: , Rfl:   •  esomeprazole (nexIUM) 40 MG capsule, Take 1 capsule by mouth 2 (Two) Times a Day Before Meals., Disp: 180 capsule, Rfl: 1  •  fish oil-omega-3 fatty acids 1000 MG capsule, Take  by mouth Daily., Disp: , Rfl:   •  fluticasone (FLONASE) 50 MCG/ACT nasal spray, 1 spray into the nostril(s) as directed by provider 2 (Two) Times a Day., Disp: 18.2 mL, Rfl: 3  •  Livalo 2 MG tablet tablet, TAKE 1 TABLET EVERY NIGHT, Disp: 90 tablet, Rfl: 3  PRN Meds:.  No Known Allergies  Social History     Socioeconomic History   • Marital status:    • Number of children: 2   • Years of education: [employer]   Tobacco Use   • Smoking status: Former     Packs/day: 0.25     Years: 2.00     Pack years: 0.50     Types: Cigarettes     Quit date: 1991     Years since quittin.5   •  Smokeless tobacco: Never   • Tobacco comments:     1 pk/yr in past quit   Vaping Use   • Vaping Use: Never used   Substance and Sexual Activity   • Alcohol use: Yes     Alcohol/week: 0.0 standard drinks     Comment: 1-2 drinks a month   • Drug use: No   • Sexual activity: Yes     Partners: Female     Comment: wife only; no hx STD's     Family History   Problem Relation Age of Onset   • Hyperlipidemia Mother    • Rosacea Mother    • Heart disease Father    • Prostate cancer Father    • Hyperlipidemia Father    • Nephrolithiasis Father    • Hyperlipidemia Brother    • Diabetes Brother    • No Known Problems Daughter    • Parkinsonism Paternal Grandmother    • Malig Hyperthermia Neg Hx      Review of Systems   Constitutional: Negative for appetite change and unexpected weight change.   HENT: Positive for trouble swallowing.    Respiratory: Positive for cough. Negative for shortness of breath.    Gastrointestinal: Negative for abdominal pain, nausea and vomiting.     Vitals:    12/06/22 1455   BP: 115/74   Pulse: 77   Temp: 97.3 °F (36.3 °C)   SpO2: 98%         12/06/22  1455   Weight: 83.2 kg (183 lb 6.4 oz)       Objective   Physical Exam  Constitutional:       Appearance: Normal appearance. He is well-developed.   HENT:      Head: Normocephalic and atraumatic.   Eyes:      General: No scleral icterus.     Conjunctiva/sclera: Conjunctivae normal.   Pulmonary:      Effort: Pulmonary effort is normal.   Abdominal:      General: There is no distension.      Palpations: Abdomen is soft.   Musculoskeletal:      Cervical back: Normal range of motion and neck supple.   Skin:     General: Skin is warm and dry.   Neurological:      Mental Status: He is alert.   Psychiatric:         Mood and Affect: Mood normal.         Behavior: Behavior normal.       No radiology results for the last 7 days    Assessment & Plan   Diagnoses and all orders for this visit:    Esophagitis, eosinophilic    Esophageal dysphagia    Cough in  adult    Other orders  -     esomeprazole (nexIUM) 40 MG capsule; Take 1 capsule by mouth 2 (Two) Times a Day Before Meals.      Plan:  · Increase Nexium to 40 mg twice daily through the first of the year.  He will let me know how he responds to this.  If his response is suboptimal, would consider swallowed steroids given his response to the Valisone cream.  Could also consider Dupixent although cost may be an issue.

## 2023-06-09 RX ORDER — ESOMEPRAZOLE MAGNESIUM 40 MG/1
40 CAPSULE, DELAYED RELEASE ORAL
Qty: 180 CAPSULE | Refills: 1 | Status: SHIPPED | OUTPATIENT
Start: 2023-06-09

## 2023-07-17 PROBLEM — R39.12 BENIGN PROSTATIC HYPERPLASIA WITH WEAK URINARY STREAM: Status: ACTIVE | Noted: 2023-07-17

## 2023-07-17 PROBLEM — N40.1 BENIGN PROSTATIC HYPERPLASIA WITH WEAK URINARY STREAM: Status: ACTIVE | Noted: 2023-07-17

## 2023-07-17 PROBLEM — N52.8 OTHER MALE ERECTILE DYSFUNCTION: Status: ACTIVE | Noted: 2023-07-17

## 2023-09-25 DIAGNOSIS — E78.2 MIXED HYPERLIPIDEMIA: ICD-10-CM

## 2023-09-25 NOTE — TELEPHONE ENCOUNTER
"    Caller: Enzo Sewell \"Ammon\"    Relationship: Self    Best call back number: [phone]     Requested Prescriptions:   Requested Prescriptions     Pending Prescriptions Disp Refills    pitavastatin calcium (Livalo) 2 MG tablet tablet 90 tablet 3     Sig: Take 1 tablet by mouth Every Night.        Pharmacy where request should be sent: [facility] [address] [phone] [facility] [phone]      Last office visit with prescribing clinician: 7/17/2023   Last telemedicine visit with prescribing clinician: Visit date not found   Next office visit with prescribing clinician: 9/19/2024     Additional details provided by patient:     Does the patient have less than a 3 day supply:  [x] Yes  [] No    Would you like a call back once the refill request has been completed: [] Yes [x] No    If the office needs to give you a call back, can they leave a voicemail: [] Yes [x] No    Terrell Carlson   09/25/23 13:06 EDT         "

## 2023-12-07 RX ORDER — ESOMEPRAZOLE MAGNESIUM 40 MG/1
CAPSULE, DELAYED RELEASE ORAL
Qty: 180 CAPSULE | Refills: 0 | Status: SHIPPED | OUTPATIENT
Start: 2023-12-07

## 2023-12-08 ENCOUNTER — TELEPHONE (OUTPATIENT)
Dept: GASTROENTEROLOGY | Facility: CLINIC | Age: 48
End: 2023-12-08
Payer: COMMERCIAL

## 2024-01-03 ENCOUNTER — OFFICE VISIT (OUTPATIENT)
Dept: INTERNAL MEDICINE | Facility: CLINIC | Age: 49
End: 2024-01-03
Payer: COMMERCIAL

## 2024-01-03 VITALS
SYSTOLIC BLOOD PRESSURE: 121 MMHG | DIASTOLIC BLOOD PRESSURE: 73 MMHG | HEART RATE: 73 BPM | BODY MASS INDEX: 22.61 KG/M2 | OXYGEN SATURATION: 98 % | HEIGHT: 73 IN | WEIGHT: 170.6 LBS

## 2024-01-03 DIAGNOSIS — F41.1 GENERALIZED ANXIETY DISORDER WITH PANIC ATTACKS: Primary | ICD-10-CM

## 2024-01-03 DIAGNOSIS — F32.1 CURRENT MODERATE EPISODE OF MAJOR DEPRESSIVE DISORDER WITHOUT PRIOR EPISODE: ICD-10-CM

## 2024-01-03 DIAGNOSIS — R00.0 TACHYCARDIA: ICD-10-CM

## 2024-01-03 DIAGNOSIS — F41.0 GENERALIZED ANXIETY DISORDER WITH PANIC ATTACKS: Primary | ICD-10-CM

## 2024-01-03 PROCEDURE — 93000 ELECTROCARDIOGRAM COMPLETE: CPT | Performed by: STUDENT IN AN ORGANIZED HEALTH CARE EDUCATION/TRAINING PROGRAM

## 2024-01-03 PROCEDURE — 99214 OFFICE O/P EST MOD 30 MIN: CPT | Performed by: STUDENT IN AN ORGANIZED HEALTH CARE EDUCATION/TRAINING PROGRAM

## 2024-01-03 RX ORDER — HYDROXYZINE HYDROCHLORIDE 25 MG/1
25 TABLET, FILM COATED ORAL 3 TIMES DAILY PRN
Qty: 30 TABLET | Refills: 0 | Status: SHIPPED | OUTPATIENT
Start: 2024-01-03

## 2024-01-03 RX ORDER — ESCITALOPRAM OXALATE 10 MG/1
10 TABLET ORAL DAILY
Qty: 30 TABLET | Refills: 0 | Status: SHIPPED | OUTPATIENT
Start: 2024-01-03

## 2024-01-03 NOTE — PROGRESS NOTES
"  Asif Coronado M.D.  Internal Medicine  [facility]  [address], [address]  [place] [address]  [phone]      Chief Complaint  Panic Attack (Discuss panic attack, states he has had a couple panic attacks /)    SUBJECTIVE    History of Present Illness    Enzo Sewell is a 48 y.o. male with HLD who presents to the office today as an established patient of Dr Jesse Witt MD here today for an acute care visit.     No history of clinical depression/anxiety.     Has passive SI.  States he has thought about going to sleep and not waking up.    He reports lots of family stress.     In [place] had \"Panic\" attack. Was having stressful conversation at the time.  He lost contol of emotions, could not breath.  He sat on a bench to recover.  He reports 4 more attacks since.  States the worst episode he fell on basement floor and tried to breath.  Symptoms include arms, legs and fingertips are in pain during episode. Has massive headache after and muscles are sore after an attack. Has left shoulder pain after. Tingling feeling on left face after. No chest pain.     He has presycope. No syncope.     Notes apple watch is warning him heart rate is high at work. No panic attacks at work. He is stressed when heart rate is high so he tries to breathe.     States he is moving slowly.     Going to crisis management meetings for family issue    Has Jan 16th appointment with therapist.     Rows regularly and uses Sauna after.  No chest pain with exercise    Review of Systems    No Known Allergies     Outpatient Medications Marked as Taking for the 1/3/24 encounter (Office Visit) with Asif Coronado MD   Medication Sig Dispense Refill    betamethasone valerate (VALISONE) 0.1 % cream Apply 1 application topically to the appropriate area as directed 2 (Two) Times a Day. X 2 weeks maxiumum 45 g 2    Cholecalciferol (VITAMIN D-3) 1000 UNITS capsule Take 1,000 Units by mouth Daily.      esomeprazole (nexIUM) 40 " MG capsule TAKE 1 CAPSULE TWICE A DAY BEFORE MEALS 180 capsule 0    fish oil-omega-3 fatty acids 1000 MG capsule Take  by mouth Daily.      pitavastatin calcium (Livalo) 2 MG tablet tablet Take 1 tablet by mouth Every Night. 90 tablet 2    tadalafil (CIALIS) 5 MG tablet Take 1 tablet by mouth Daily As Needed for Erectile Dysfunction.          Past Medical History:   Diagnosis Date    Cough variant asthma     normal PFT's at baseline with positive bronchoprovocation study in 2013    DDD (degenerative disc disease), lumbar     s/p epidural in 2013; more distant hx of disc herniation to L S1 nerve root in 2008 largely resorbed on 2013 MRI.     Displacement of lumbar disc with radiculopathy 7/17/2017    S/p L5-S1 discectomy    GERD (gastroesophageal reflux disease)     Gilbert's syndrome     suspected with variable indirect elevations of bilirubin on labs    History of kidney stones     x 3; last in ~2017    Hyperlipidemia     Low back pain     Lumbar radiculopathy     s/p epidural in 2013    Nephrolithiasis     s/p stent and retreival 3/2013    Rosacea     Snoring 10/7/2016    Resolved with weight loss in 2017; hx of uvulectomy and septoplasty in 2008    Vitamin D deficiency      Past Surgical History:   Procedure Laterality Date    COLONOSCOPY  02/2022    CYSTOSCOPY      with insertion of ureteral stent 2013    EPIDURAL BLOCK      EYE SURGERY      lasik bilateral 2013    LUMBAR DISCECTOMY FUSION INSTRUMENTATION Left 08/07/2017    Procedure: Left L5-S1 laminectomy/discectomy with Metrix;  Surgeon: Corey García MD;  Location: [facility];  Service:     SEPTOPLASTY      2008 with T&A    TONSILLECTOMY AND ADENOIDECTOMY      2008    UPPER GASTROINTESTINAL ENDOSCOPY  02/2022    URETEROSCOPY LASER LITHOTRIPSY WITH STENT INSERTION Right 05/2022    VASECTOMY      2010     Family History   Problem Relation Age of Onset    Hyperlipidemia Mother     Rosacea Mother     Heart disease Father     Prostate cancer Father      "Hyperlipidemia Father     Nephrolithiasis Father     Gout Father     Hyperlipidemia Brother     Diabetes Brother     Parkinsonism Paternal Grandmother     No Known Problems Daughter     Huan Christiansen Neg Hx     reports that he quit smoking about 32 years ago. His smoking use included cigarettes. He has a 0.50 pack-year smoking history. He has never used smokeless tobacco. He reports current alcohol use. He reports that he does not use drugs.    OBJECTIVE    Vital Signs:   /73   Pulse 73   Ht 185.4 cm (72.99\")   Wt 77.4 kg (170 lb 9.6 oz)   SpO2 98%   BMI 22.51 kg/m²     Physical Exam  Constitutional:       Appearance: Normal appearance. He is normal weight.   Cardiovascular:      Rate and Rhythm: Normal rate and regular rhythm.      Heart sounds: Normal heart sounds. No murmur heard.  Pulmonary:      Effort: Pulmonary effort is normal.      Breath sounds: Normal breath sounds.   Musculoskeletal:      Right lower leg: No edema.      Left lower leg: No edema.      Comments: Left shoulder is tender.  He has full range of motion of the left shoulder.   Skin:     General: Skin is warm and dry.   Neurological:      General: No focal deficit present.      Mental Status: He is alert.      Cranial Nerves: No cranial nerve deficit.      Sensory: No sensory deficit.      Comments: Normal sensation to 10 mm monofilament of face.     Psychiatric:      Comments: Depressed mood            The following data was reviewed by: Asif Coronado MD on 01/03/2024:  CMP          7/12/2023    09:19   CMP   Glucose 95    BUN 24    Creatinine 1.15    Sodium 142    Potassium 5.2    Chloride 105    Calcium 9.9    Total Protein 6.6    Albumin 4.9    Globulin 1.7    Total Bilirubin 1.1    Alkaline Phosphatase 53    AST (SGOT) 21    ALT (SGPT) 20    BUN/Creatinine Ratio 20.9      CBC w/diff          7/12/2023    09:19   CBC w/Diff   WBC 6.76    RBC 5.33    Hemoglobin 15.6    Hematocrit 46.2    MCV 86.7    MCH 29.3    MCHC 33.8    RDW " "13.0    Platelets 220    Neutrophil Rel % 48.1    Lymphocyte Rel % 36.5    Monocyte Rel % 8.1    Eosinophil Rel % 5.9    Basophil Rel % 1.0      Lipid Panel          7/12/2023    09:19   Lipid Panel   Total Cholesterol 227    Triglycerides 160    HDL Cholesterol 45    VLDL Cholesterol 29    LDL Cholesterol  153      TSH          7/12/2023    09:19   TSH   TSH 0.940      A1C Last 3 Results          7/12/2023    09:19   HGBA1C Last 3 Results   Hemoglobin A1C 5.50      Data reviewed : Recent PCP notes and previous EKG.              ASSESSMENT & PLAN     Diagnoses and all orders for this visit:    1. Generalized anxiety disorder with panic attacks (Primary)  -     escitalopram (Lexapro) 10 MG tablet; Take 1 tablet by mouth Daily.  Dispense: 30 tablet; Refill: 0  -     hydrOXYzine (ATARAX) 25 MG tablet; Take 1 tablet by mouth 3 (Three) Times a Day As Needed for Anxiety.  Dispense: 30 tablet; Refill: 0    2. Tachycardia  -     ECG 12 Lead    3. Current moderate episode of major depressive disorder without prior episode  -     escitalopram (Lexapro) 10 MG tablet; Take 1 tablet by mouth Daily.  Dispense: 30 tablet; Refill: 0      48 y.o. male with HLD here today for panic attacks associated with left shoulder pain and left facial numbness.  He also reports tachycardia which is unrelated to attacks.  EKG performed in office was unchanged from previous.  I suspect tachycardia is related to anxiety.  Discussed that we could check a Holter monitor to evaluate this further but he declines for now.  I suspect shoulder discomfort is most likely musculoskeletal.  He has a normal neurologic exam today.  Suspect facial numbness is related to panic attacks.    Depression and anxiety with panic attacks is a new diagnosis and seems to be situational.  Discussed treatment options including SSRI and common side effects.  Discussed this is first-line treatment for anxiety with panic attacks.  Discussed that this would not be an \"instant " "fix\" as SSRIs can take weeks to take full effect.  Discussed as needed treatment options for acute panic attacks.  He would be a reasonable candidate for a short-term course of benzodiazepines for 2 weeks however he is concerned about something \"turning him into a zombie\".  We will try hydroxyzine as needed for anxiety.  Patient already has an appointment with counselor and I encouraged him to follow-up with this.  Will have patient follow-up closely with his PCP and consider dose increase of Lexapro at next appointment.       Health Maintenance Due   Topic Date Due    COVID-19 Vaccine (4 - 2023-24 season) 09/01/2023        Follow Up  Return in about 4 weeks (around 1/31/2024) for Recheck.    Patient/family had no further questions at this time and verbalized understanding of the plan discussed today.   "

## 2024-01-12 DIAGNOSIS — L20.89 OTHER ATOPIC DERMATITIS: ICD-10-CM

## 2024-02-05 DIAGNOSIS — F41.1 GENERALIZED ANXIETY DISORDER WITH PANIC ATTACKS: ICD-10-CM

## 2024-02-05 DIAGNOSIS — F41.0 GENERALIZED ANXIETY DISORDER WITH PANIC ATTACKS: ICD-10-CM

## 2024-02-05 DIAGNOSIS — F32.1 CURRENT MODERATE EPISODE OF MAJOR DEPRESSIVE DISORDER WITHOUT PRIOR EPISODE: ICD-10-CM

## 2024-02-05 RX ORDER — ESCITALOPRAM OXALATE 10 MG/1
10 TABLET ORAL DAILY
Qty: 30 TABLET | Refills: 0 | Status: SHIPPED | OUTPATIENT
Start: 2024-02-05

## 2024-03-05 RX ORDER — ESOMEPRAZOLE MAGNESIUM 40 MG/1
CAPSULE, DELAYED RELEASE ORAL
Qty: 180 CAPSULE | Refills: 3 | OUTPATIENT
Start: 2024-03-05

## 2024-09-16 DIAGNOSIS — R39.12 BENIGN PROSTATIC HYPERPLASIA WITH WEAK URINARY STREAM: ICD-10-CM

## 2024-09-16 DIAGNOSIS — E78.2 MIXED HYPERLIPIDEMIA: ICD-10-CM

## 2024-09-16 DIAGNOSIS — Z00.00 ANNUAL PHYSICAL EXAM: Primary | ICD-10-CM

## 2024-09-16 DIAGNOSIS — Z13.29 SCREENING FOR THYROID DISORDER: ICD-10-CM

## 2024-09-16 DIAGNOSIS — E55.9 VITAMIN D DEFICIENCY: ICD-10-CM

## 2024-09-16 DIAGNOSIS — N40.1 BENIGN PROSTATIC HYPERPLASIA WITH WEAK URINARY STREAM: ICD-10-CM

## 2024-09-17 LAB
25(OH)D3+25(OH)D2 SERPL-MCNC: 28.1 NG/ML (ref 30–100)
ALBUMIN SERPL-MCNC: 4.4 G/DL (ref 3.5–5.2)
ALBUMIN/GLOB SERPL: 2.3 G/DL
ALP SERPL-CCNC: 58 U/L (ref 39–117)
ALT SERPL-CCNC: 16 U/L (ref 1–41)
APPEARANCE UR: CLEAR
AST SERPL-CCNC: 13 U/L (ref 1–40)
BACTERIA #/AREA URNS HPF: NORMAL /[HPF]
BASOPHILS # BLD AUTO: 0.09 10*3/MM3 (ref 0–0.2)
BASOPHILS NFR BLD AUTO: 1.3 % (ref 0–1.5)
BILIRUB SERPL-MCNC: 1.1 MG/DL (ref 0–1.2)
BILIRUB UR QL STRIP: NEGATIVE
BUN SERPL-MCNC: 22 MG/DL (ref 6–20)
BUN/CREAT SERPL: 19.5 (ref 7–25)
CALCIUM SERPL-MCNC: 9.5 MG/DL (ref 8.6–10.5)
CASTS URNS QL MICRO: NORMAL /LPF
CHLORIDE SERPL-SCNC: 107 MMOL/L (ref 98–107)
CHOLEST SERPL-MCNC: 319 MG/DL (ref 0–200)
CHOLEST/HDLC SERPL: 8.18 {RATIO}
CO2 SERPL-SCNC: 25.8 MMOL/L (ref 22–29)
COLOR UR: YELLOW
CREAT SERPL-MCNC: 1.13 MG/DL (ref 0.76–1.27)
EGFRCR SERPLBLD CKD-EPI 2021: 80.2 ML/MIN/1.73
EOSINOPHIL # BLD AUTO: 0.42 10*3/MM3 (ref 0–0.4)
EOSINOPHIL NFR BLD AUTO: 6.2 % (ref 0.3–6.2)
EPI CELLS #/AREA URNS HPF: NORMAL /HPF (ref 0–10)
ERYTHROCYTE [DISTWIDTH] IN BLOOD BY AUTOMATED COUNT: 13.7 % (ref 12.3–15.4)
GLOBULIN SER CALC-MCNC: 1.9 GM/DL
GLUCOSE SERPL-MCNC: 94 MG/DL (ref 65–99)
GLUCOSE UR QL STRIP: NEGATIVE
HBA1C MFR BLD: 5.3 % (ref 4.8–5.6)
HCT VFR BLD AUTO: 47.3 % (ref 37.5–51)
HDLC SERPL-MCNC: 39 MG/DL (ref 40–60)
HGB BLD-MCNC: 16.1 G/DL (ref 13–17.7)
HGB UR QL STRIP: NEGATIVE
IMM GRANULOCYTES # BLD AUTO: 0.04 10*3/MM3 (ref 0–0.05)
IMM GRANULOCYTES NFR BLD AUTO: 0.6 % (ref 0–0.5)
KETONES UR QL STRIP: NEGATIVE
LDLC SERPL CALC-MCNC: 227 MG/DL (ref 0–100)
LEUKOCYTE ESTERASE UR QL STRIP: NEGATIVE
LYMPHOCYTES # BLD AUTO: 3.04 10*3/MM3 (ref 0.7–3.1)
LYMPHOCYTES NFR BLD AUTO: 44.8 % (ref 19.6–45.3)
MCH RBC QN AUTO: 30.2 PG (ref 26.6–33)
MCHC RBC AUTO-ENTMCNC: 34 G/DL (ref 31.5–35.7)
MCV RBC AUTO: 88.7 FL (ref 79–97)
MICRO URNS: NORMAL
MICRO URNS: NORMAL
MONOCYTES # BLD AUTO: 0.51 10*3/MM3 (ref 0.1–0.9)
MONOCYTES NFR BLD AUTO: 7.5 % (ref 5–12)
NEUTROPHILS # BLD AUTO: 2.69 10*3/MM3 (ref 1.7–7)
NEUTROPHILS NFR BLD AUTO: 39.6 % (ref 42.7–76)
NITRITE UR QL STRIP: NEGATIVE
NRBC BLD AUTO-RTO: 0 /100 WBC (ref 0–0.2)
PH UR STRIP: 5.5 [PH] (ref 5–7.5)
PLATELET # BLD AUTO: 219 10*3/MM3 (ref 140–450)
POTASSIUM SERPL-SCNC: 4.6 MMOL/L (ref 3.5–5.2)
PROT SERPL-MCNC: 6.3 G/DL (ref 6–8.5)
PROT UR QL STRIP: NEGATIVE
RBC # BLD AUTO: 5.33 10*6/MM3 (ref 4.14–5.8)
RBC #/AREA URNS HPF: NORMAL /HPF (ref 0–2)
SODIUM SERPL-SCNC: 141 MMOL/L (ref 136–145)
SP GR UR STRIP: 1.02 (ref 1–1.03)
TRIGL SERPL-MCNC: 257 MG/DL (ref 0–150)
TSH SERPL DL<=0.005 MIU/L-ACNC: 1.1 UIU/ML (ref 0.27–4.2)
URINALYSIS REFLEX: NORMAL
UROBILINOGEN UR STRIP-MCNC: 0.2 MG/DL (ref 0.2–1)
VLDLC SERPL CALC-MCNC: 53 MG/DL (ref 5–40)
WBC # BLD AUTO: 6.79 10*3/MM3 (ref 3.4–10.8)
WBC #/AREA URNS HPF: NORMAL /HPF (ref 0–5)

## 2024-09-19 ENCOUNTER — OFFICE VISIT (OUTPATIENT)
Dept: INTERNAL MEDICINE | Facility: CLINIC | Age: 49
End: 2024-09-19
Payer: COMMERCIAL

## 2024-09-19 VITALS
HEIGHT: 73 IN | OXYGEN SATURATION: 98 % | WEIGHT: 182.6 LBS | HEART RATE: 72 BPM | TEMPERATURE: 97.6 F | DIASTOLIC BLOOD PRESSURE: 74 MMHG | BODY MASS INDEX: 24.2 KG/M2 | SYSTOLIC BLOOD PRESSURE: 118 MMHG

## 2024-09-19 DIAGNOSIS — N20.0 NEPHROLITHIASIS: ICD-10-CM

## 2024-09-19 DIAGNOSIS — N52.8 OTHER MALE ERECTILE DYSFUNCTION: ICD-10-CM

## 2024-09-19 DIAGNOSIS — Z91.09 ENVIRONMENTAL ALLERGIES: ICD-10-CM

## 2024-09-19 DIAGNOSIS — E78.2 MIXED HYPERLIPIDEMIA: ICD-10-CM

## 2024-09-19 DIAGNOSIS — N40.1 BENIGN PROSTATIC HYPERPLASIA WITH WEAK URINARY STREAM: ICD-10-CM

## 2024-09-19 DIAGNOSIS — E55.9 VITAMIN D DEFICIENCY: ICD-10-CM

## 2024-09-19 DIAGNOSIS — K21.9 GASTROESOPHAGEAL REFLUX DISEASE, UNSPECIFIED WHETHER ESOPHAGITIS PRESENT: ICD-10-CM

## 2024-09-19 DIAGNOSIS — J45.991 COUGH VARIANT ASTHMA: ICD-10-CM

## 2024-09-19 DIAGNOSIS — R39.12 BENIGN PROSTATIC HYPERPLASIA WITH WEAK URINARY STREAM: ICD-10-CM

## 2024-09-19 DIAGNOSIS — F43.23 ADJUSTMENT DISORDER WITH MIXED ANXIETY AND DEPRESSED MOOD: ICD-10-CM

## 2024-09-19 DIAGNOSIS — K20.0 EOSINOPHILIC ESOPHAGITIS: ICD-10-CM

## 2024-09-19 DIAGNOSIS — L71.9 ROSACEA: ICD-10-CM

## 2024-09-19 DIAGNOSIS — Z00.01 ENCOUNTER FOR GENERAL ADULT MEDICAL EXAMINATION WITH ABNORMAL FINDINGS: Primary | ICD-10-CM

## 2024-09-19 PROCEDURE — 99396 PREV VISIT EST AGE 40-64: CPT | Performed by: INTERNAL MEDICINE

## 2024-09-19 RX ORDER — TADALAFIL 5 MG/1
5 TABLET ORAL DAILY
Start: 2024-09-19

## 2024-09-19 RX ORDER — PITAVASTATIN CALCIUM 2.09 MG/1
2 TABLET, FILM COATED ORAL NIGHTLY
Start: 2024-09-19

## 2024-09-19 RX ORDER — CETIRIZINE HYDROCHLORIDE 10 MG/1
10 TABLET ORAL DAILY
COMMUNITY

## 2024-09-19 RX ORDER — CHOLECALCIFEROL (VITAMIN D3) 25 MCG
1000 CAPSULE ORAL DAILY
Start: 2024-09-19

## 2024-10-03 DIAGNOSIS — E78.2 MIXED HYPERLIPIDEMIA: ICD-10-CM

## 2024-10-03 RX ORDER — PITAVASTATIN CALCIUM 2.09 MG/1
2 TABLET, FILM COATED ORAL NIGHTLY
Start: 2024-10-03

## 2024-10-03 NOTE — TELEPHONE ENCOUNTER
"     Caller: Enzo Sewell \"Ammon\"    Relationship: Self    Best call back number:     [phone]        Requested Prescriptions:   Requested Prescriptions     Pending Prescriptions Disp Refills    pitavastatin calcium (Livalo) 2 MG tablet tablet       Sig: Take 1 tablet by mouth Every Night.        Pharmacy where request should be sent: [id number] - [address]  AT [place] [address] & [address]  [phone] [facility] [phone]      Last office visit with prescribing clinician: 9/19/2024   Last telemedicine visit with prescribing clinician: Visit date not found   Next office visit with prescribing clinician: 10/13/2025     Additional details provided by patient:     Does the patient have less than a 3 day supply:  [] Yes  [] No    Would you like a call back once the refill request has been completed: [] Yes [] No    If the office needs to give you a call back, can they leave a voicemail: [] Yes [] No    Jesse Carbajal   10/03/24 11:53 EDT            "

## 2024-11-27 ENCOUNTER — TELEPHONE (OUTPATIENT)
Dept: INTERNAL MEDICINE | Facility: CLINIC | Age: 49
End: 2024-11-27
Payer: COMMERCIAL

## 2024-11-27 DIAGNOSIS — E78.2 MIXED HYPERLIPIDEMIA: ICD-10-CM

## 2024-11-27 RX ORDER — PITAVASTATIN CALCIUM 2.09 MG/1
2 TABLET, FILM COATED ORAL NIGHTLY
Qty: 90 TABLET | Refills: 3 | Status: SHIPPED | OUTPATIENT
Start: 2024-11-27

## 2024-11-27 NOTE — TELEPHONE ENCOUNTER
PATIENT CALLED AND STATES HE DID NOT RECEIVE HIS MEDICATION REFILL OF  pitavastatin calcium (Livalo) 2 MG tablet tablet   FROM 10/3/24.    PLEASE RESEND  [id number] - [place] - [address] AT [facility] - [phone]  - [phone]     CALL BACK NUMBER [phone]    HE WILL RESCHEDULE HIS LAB APPOINTMENT

## 2025-01-20 DIAGNOSIS — E78.2 MIXED HYPERLIPIDEMIA: ICD-10-CM

## 2025-01-20 RX ORDER — PITAVASTATIN CALCIUM 2.09 MG/1
2 TABLET, FILM COATED ORAL NIGHTLY
Qty: 90 TABLET | Refills: 3 | Status: SHIPPED | OUTPATIENT
Start: 2025-01-20

## 2025-01-22 DIAGNOSIS — K21.9 GASTROESOPHAGEAL REFLUX DISEASE, UNSPECIFIED WHETHER ESOPHAGITIS PRESENT: ICD-10-CM

## 2025-01-22 DIAGNOSIS — E78.2 MIXED HYPERLIPIDEMIA: Primary | ICD-10-CM

## 2025-01-24 LAB
ALBUMIN SERPL-MCNC: 4.3 G/DL (ref 3.5–5.2)
ALBUMIN/GLOB SERPL: 1.7 G/DL
ALP SERPL-CCNC: 71 U/L (ref 39–117)
ALT SERPL-CCNC: 20 U/L (ref 1–41)
AST SERPL-CCNC: 17 U/L (ref 1–40)
BILIRUB SERPL-MCNC: 0.8 MG/DL (ref 0–1.2)
BUN SERPL-MCNC: 21 MG/DL (ref 6–20)
BUN/CREAT SERPL: 18.6 (ref 7–25)
CALCIUM SERPL-MCNC: 9.7 MG/DL (ref 8.6–10.5)
CHLORIDE SERPL-SCNC: 100 MMOL/L (ref 98–107)
CHOLEST SERPL-MCNC: 246 MG/DL (ref 0–200)
CO2 SERPL-SCNC: 27.4 MMOL/L (ref 22–29)
CREAT SERPL-MCNC: 1.13 MG/DL (ref 0.76–1.27)
EGFRCR SERPLBLD CKD-EPI 2021: 79.7 ML/MIN/1.73
GLOBULIN SER CALC-MCNC: 2.5 GM/DL
GLUCOSE SERPL-MCNC: 99 MG/DL (ref 65–99)
HDLC SERPL-MCNC: 35 MG/DL (ref 40–60)
LDLC SERPL CALC-MCNC: 168 MG/DL (ref 0–100)
POTASSIUM SERPL-SCNC: 5 MMOL/L (ref 3.5–5.2)
PROT SERPL-MCNC: 6.8 G/DL (ref 6–8.5)
SODIUM SERPL-SCNC: 140 MMOL/L (ref 136–145)
TRIGL SERPL-MCNC: 229 MG/DL (ref 0–150)
VLDLC SERPL CALC-MCNC: 43 MG/DL (ref 5–40)

## 2025-01-30 RX ORDER — PITAVASTATIN CALCIUM 4.18 MG/1
4 TABLET, FILM COATED ORAL NIGHTLY
Qty: 90 TABLET | Refills: 2 | Status: SHIPPED | OUTPATIENT
Start: 2025-01-30

## 2025-01-31 DIAGNOSIS — E78.2 MIXED HYPERLIPIDEMIA: Primary | ICD-10-CM

## 2025-01-31 DIAGNOSIS — K21.9 GASTROESOPHAGEAL REFLUX DISEASE, UNSPECIFIED WHETHER ESOPHAGITIS PRESENT: ICD-10-CM

## (undated) DEVICE — SYR CONTRL LUERLOK 10CC

## (undated) DEVICE — Device

## (undated) DEVICE — SPNG GZ WOVN 4X4IN 12PLY 10/BX STRL

## (undated) DEVICE — PK NEURO SPINE 40

## (undated) DEVICE — CODMAN® SURGICAL PATTIES 3/4" X 3/4" (1.91CM X 1.91CM): Brand: CODMAN®

## (undated) DEVICE — DRP MICROSCP LEICA W/GLASS LENS

## (undated) DEVICE — DIFFUSER: Brand: CORE, MAESTRO

## (undated) DEVICE — SHEET, DRAPE, SPLIT, STERILE: Brand: MEDLINE

## (undated) DEVICE — UNDYED BRAIDED (POLYGLACTIN 910), SYNTHETIC ABSORBABLE SUTURE: Brand: COATED VICRYL

## (undated) DEVICE — GLV SURG SENSICARE W/ALOE PF LF 7.5 STRL

## (undated) DEVICE — OIL CARTRIDGE: Brand: CORE, MAESTRO

## (undated) DEVICE — SOL NACL 0.9PCT 100ML SGL

## (undated) DEVICE — SPNG GZ STRL 2S 4X4 12PLY

## (undated) DEVICE — CONN TBG Y 5 IN 1 LF STRL

## (undated) DEVICE — NDL SPINE 20G 3 1/2 YEL STRL 1P/U

## (undated) DEVICE — SMOKE EVACUATION TUBING WITH 7/8 IN TO 1/4 IN REDUCER: Brand: BUFFALO FILTER

## (undated) DEVICE — 6.0MM PRECISION ROUND

## (undated) DEVICE — ADHS SKIN DERMABOND TOP ADVANCED

## (undated) DEVICE — GLV SURG BIOGEL LTX PF 7

## (undated) DEVICE — APPL CHLORAPREP W/TINT 26ML ORNG

## (undated) DEVICE — FLOSEAL MATRIX IS INDICATED IN SURGICAL PROCEDURES (OTHER THAN IN OPHTHALMIC) AS AN ADJUNCT TO HEMOSTASIS WHEN CONTROL OF BLEEDING BY LIGATURE OR CONVENTIONAL PROCEDURES IS INEFFECTIVE OR IMPRACTICAL.: Brand: FLOSEAL HEMOSTATIC MATRIX